# Patient Record
Sex: FEMALE | Race: WHITE
[De-identification: names, ages, dates, MRNs, and addresses within clinical notes are randomized per-mention and may not be internally consistent; named-entity substitution may affect disease eponyms.]

---

## 2017-12-19 ENCOUNTER — HOSPITAL ENCOUNTER (EMERGENCY)
Dept: HOSPITAL 75 - ER | Age: 37
Discharge: HOME | End: 2017-12-19
Payer: SELF-PAY

## 2017-12-19 VITALS — BODY MASS INDEX: 32.95 KG/M2 | WEIGHT: 205 LBS | HEIGHT: 66 IN

## 2017-12-19 VITALS — SYSTOLIC BLOOD PRESSURE: 120 MMHG | DIASTOLIC BLOOD PRESSURE: 88 MMHG

## 2017-12-19 DIAGNOSIS — O99.341: ICD-10-CM

## 2017-12-19 DIAGNOSIS — F41.9: ICD-10-CM

## 2017-12-19 DIAGNOSIS — Z79.84: ICD-10-CM

## 2017-12-19 DIAGNOSIS — Z3A.10: ICD-10-CM

## 2017-12-19 DIAGNOSIS — R10.2: ICD-10-CM

## 2017-12-19 DIAGNOSIS — O99.89: Primary | ICD-10-CM

## 2017-12-19 LAB
BASOPHILS # BLD AUTO: 0 10^3/UL (ref 0–0.1)
BASOPHILS NFR BLD AUTO: 0 % (ref 0–10)
BASOPHILS NFR BLD MANUAL: 0 %
BILIRUB UR QL STRIP: NEGATIVE
EOSINOPHIL # BLD AUTO: 0.1 10^3/UL (ref 0–0.3)
EOSINOPHIL NFR BLD AUTO: 1 % (ref 0–10)
EOSINOPHIL NFR BLD MANUAL: 0 %
ERYTHROCYTE [DISTWIDTH] IN BLOOD BY AUTOMATED COUNT: 12.6 % (ref 10–14.5)
KETONES UR QL STRIP: NEGATIVE
LEUKOCYTE ESTERASE UR QL STRIP: NEGATIVE
LYMPHOCYTES # BLD AUTO: 4.4 X 10^3 (ref 1–4)
LYMPHOCYTES NFR BLD AUTO: 29 % (ref 12–44)
MCH RBC QN AUTO: 30 PG (ref 25–34)
MCHC RBC AUTO-ENTMCNC: 35 G/DL (ref 32–36)
MCV RBC AUTO: 85 FL (ref 80–99)
MONOCYTES # BLD AUTO: 1.1 X 10^3 (ref 0–1)
MONOCYTES NFR BLD AUTO: 7 % (ref 0–12)
NEUTROPHILS # BLD AUTO: 9.5 X 10^3 (ref 1.8–7.8)
NEUTROPHILS NFR BLD AUTO: 63 % (ref 42–75)
NEUTS BAND NFR BLD MANUAL: 71 %
NEUTS BAND NFR BLD: 1 %
NITRITE UR QL STRIP: NEGATIVE
PH UR STRIP: 6 [PH] (ref 5–9)
PLATELET # BLD: 383 10^3/UL (ref 130–400)
PMV BLD AUTO: 10.5 FL (ref 7.4–10.4)
PROT UR QL STRIP: NEGATIVE
RBC # BLD AUTO: 4.48 10^6/UL (ref 4.35–5.85)
SP GR UR STRIP: 1.01 (ref 1.02–1.02)
SQUAMOUS #/AREA URNS HPF: (no result) /HPF
UROBILINOGEN UR-MCNC: NORMAL MG/DL
VARIANT LYMPHS NFR BLD MANUAL: 26 %
WBC # BLD AUTO: 15.1 10^3/UL (ref 4.3–11)
WBC #/AREA URNS HPF: (no result) /HPF

## 2017-12-19 PROCEDURE — 36415 COLL VENOUS BLD VENIPUNCTURE: CPT

## 2017-12-19 PROCEDURE — 84702 CHORIONIC GONADOTROPIN TEST: CPT

## 2017-12-19 PROCEDURE — 85007 BL SMEAR W/DIFF WBC COUNT: CPT

## 2017-12-19 PROCEDURE — 76801 OB US < 14 WKS SINGLE FETUS: CPT

## 2017-12-19 PROCEDURE — 99283 EMERGENCY DEPT VISIT LOW MDM: CPT

## 2017-12-19 PROCEDURE — 85027 COMPLETE CBC AUTOMATED: CPT

## 2017-12-19 PROCEDURE — 81000 URINALYSIS NONAUTO W/SCOPE: CPT

## 2017-12-19 NOTE — ED ABDOMINAL PAIN
General


Stated Complaint:  10 WKS PREG/STOMACH PAIN


Source of Information:  Patient


Exam Limitations:  No Limitations





History of Present Illness


Time Seen By Provider:  19:53


Initial Comments


To ER with bilateral lower abdominal pains that began this morning.  Pains are 

worse with certain movements and are sharp.  She is 10 weeks' gestation, .

  No vaginal bleeding.  She has not had an ultrasound but she did have a 

positive pregnancy test confirmed at the [facility] medical clinic.


Timing/Duration:  12-24 Hours


Severity/Quality:  Moderate


Location:  Suprapubic


Radiation:  No Radiation


Activities at Onset:  None





Allergies and Home Medications


Allergies


Coded Allergies:  


     No Known Drug Allergies (Unverified , 13)





Home Medications


Metformin Hcl 500 Mg Tablet, 500 MG PO BID WITH MEALS, (Reported)


Oxycodone Hcl/Acetaminophen 1 Tab Tablet, 1-2 TAB PO Q6H, #35


   Prescribed by: SUE DAVALOS on 14 1524





Review of Systems


Constitutional:  see HPI


EENTM:  No Symptoms Reported


Respiratory:  No Symptoms Reported


Cardiovascular:  No Symptoms Reported


Gastrointestinal:  See HPI, Abdominal Pain, Denies Constipated, Denies Diarrhea

, Denies Nausea


Genitourinary:  No Symptoms Reported, Denies Discharge (no vaginal bleeding)


Musculoskeletal:  no symptoms reported


Skin:  no symptoms reported


Psychiatric/Neurological:  No Symptoms Reported


Endocrine:  No Symptoms Reported





Past Medical-Social-Family Hx


Patient Social History


Recent Foreign Travel:  No


Contact w/Someone Who Travel:  No





Reproductive System


Hx Reproductive Disorders:  No


Sexually Transmitted Disease:  No





Psychosocial


Behavioral Health Disorders:  Anxiety





Physical Exam


Vital Signs





 VS - Last 72 Hours, by Label








 17





 19:45


 


Temp 97.0


 


Pulse 95


 


Resp 20


 


B/P (MAP) 119/91 (100)


 


Pulse Ox 98


 


O2 Delivery Room Air





Capillary Refill :


General Appearance:  WD/WN, no apparent distress


HEENT:  PERRL/EOMI, normal ENT inspection


Neck:  non-tender, full range of motion


Respiratory:  no respiratory distress, no accessory muscle use


Cardiovascular:  regular rate, rhythm, no murmur


Gastrointestinal:  normal bowel sounds, non tender, soft


Extremities:  normal range of motion, non-tender, normal inspection


Neurologic/Psychiatric:  alert, normal mood/affect, oriented x 3


Skin:  normal color, warm/dry





Progress/Results/Core Measures


Results/Orders


Lab Results





Laboratory Tests








Test


  17


19:55 17


20:09 Range/Units


 


 


Urine Color YELLOW    


 


Urine Clarity CLEAR    


 


Urine pH 6   5-9  


 


Urine Specific Gravity 1.015 L  1.016-1.022  


 


Urine Protein NEGATIVE   NEGATIVE  


 


Urine Glucose (UA) 3+ H  NEGATIVE  


 


Urine Ketones NEGATIVE   NEGATIVE  


 


Urine Nitrite NEGATIVE   NEGATIVE  


 


Urine Bilirubin NEGATIVE   NEGATIVE  


 


Urine Urobilinogen NORMAL   NORMAL  MG/DL


 


Urine Leukocyte Esterase NEGATIVE   NEGATIVE  


 


Urine RBC (Auto) NEGATIVE   NEGATIVE  


 


Urine RBC NONE    /HPF


 


Urine WBC 2-5    /HPF


 


Urine Squamous Epithelial


Cells 5-10 


  


   /HPF


 


 


Urine Crystals NONE    /LPF


 


Urine Bacteria MODERATE H   /HPF


 


Urine Casts NONE    /LPF


 


Urine Mucus NEGATIVE    /LPF


 


Urine Culture Indicated NO    


 


White Blood Count


  


  15.1 H


  4.3-11.0


10^3/uL


 


Red Blood Count


  


  4.48 


  4.35-5.85


10^6/uL


 


Hemoglobin  13.2  11.5-16.0  G/DL


 


Hematocrit  38  35-52  %


 


Mean Corpuscular Volume  85  80-99  FL


 


Mean Corpuscular Hemoglobin  30  25-34  PG


 


Mean Corpuscular Hemoglobin


Concent 


  35 


  32-36  G/DL


 


 


Red Cell Distribution Width  12.6  10.0-14.5  %


 


Platelet Count


  


  383 


  130-400


10^3/uL


 


Mean Platelet Volume  10.5 H 7.4-10.4  FL


 


Neutrophils (%) (Auto)  63  42-75  %


 


Lymphocytes (%) (Auto)  29  12-44  %


 


Monocytes (%) (Auto)  7  0-12  %


 


Eosinophils (%) (Auto)  1  0-10  %


 


Basophils (%) (Auto)  0  0-10  %


 


Neutrophils # (Auto)  9.5 H 1.8-7.8  X 10^3


 


Lymphocytes # (Auto)  4.4 H 1.0-4.0  X 10^3


 


Monocytes # (Auto)  1.1 H 0.0-1.0  X 10^3


 


Eosinophils # (Auto)


  


  0.1 


  0.0-0.3


10^3/uL


 


Basophils # (Auto)


  


  0.0 


  0.0-0.1


10^3/uL


 


Neutrophils % (Manual)  71   %


 


Lymphocytes % (Manual)  26   %


 


Monocytes % (Manual)  2   %


 


Eosinophils % (Manual)  0   %


 


Basophils % (Manual)  0   %


 


Band Neutrophils  1   %


 


Blood Morphology Comment  NORMAL   


 


Human Chorionic Gonadotropin,


Quant 


  [id number] H


  <5  MIU/ML


 








My Orders





Orders - ARMIDA TONEY APRN


Cbc With Automated Diff (17 19:51)


Hcg,Quantitative (17 19:51)


Ua Culture If Indicated (17 19:51)


Us Ob Single Fetus<14 [id number] (17 19:51)


Manual Differential (17 20:09)





Vital Signs/I&O





Vital Sign - Last 12Hours








 17





 19:45


 


Temp 97.0


 


Pulse 95


 


Resp 20


 


B/P (MAP) 119/91 (100)


 


Pulse Ox 98


 


O2 Delivery Room Air











Departure


Impression


Impression:  


 Primary Impression:  


 Round ligament pain


Disposition:   HOME, SELF-CARE


Condition:  Stable





Departure-Patient Inst.


Decision time for Depature:  21:07


Referrals:  


MIRTHA ROMERO DENNIS G MD





NO,LOCAL PHYSICIAN (PCP)


Primary Care Physician








BECKY WASHINGTON DO


Patient Instructions:  Round Ligament Pain





Add. Discharge Instructions:  


1. Return to ER for any concerns


2. Call one of the physicians listed to make an appointment to be seen.











ARMIDA TONEY APRN Dec 19, 2017 19:54

## 2017-12-19 NOTE — DIAGNOSTIC IMAGING REPORT
PROCEDURE: US OB SINGLE FETUS <14 WKS.



TECHNIQUE: Multiple real-time grayscale images were obtained over

the gravid uterus in various projections. 



INDICATION: Cramping.



FINDINGS:  

A santiago intrauterine gestation measures 10 weeks 2 days for a

date of confinement 07/15/2018. No subchorionic or felicita-sac

hemorrhage or fluid collection. Cardiac activity at a rate of 150

beats per minute. A right ovarian cyst measuring 2.6 cm showed no

suspicious feature. There is no adnexal torsion. No extrauterine

gestation.



IMPRESSION:

Early santiago viable IUP measuring 10 weeks 2 days.



Dictated by: 



  Dictated on workstation # [id number]

## 2017-12-19 NOTE — XMS REPORT
[facility]

 Created on: 2017



Sajan Donnelly

External Reference #: [id number]

: 1980

Sex: Female



Demographics







 Address  [address] [place]  [address]

 

 Preferred Language  Unknown

 

 Marital Status  Unknown

 

 Caodaism Affiliation  Unknown

 

 Race  Unknown

 

 Ethnic Group  Unknown





Author







 Author  GARLAND Kincaid

 

 Organization  [facility]

 

 Address  Unknown

 

 Phone  [phone]







Care Team Providers







 Care Team Member Name  Role  Phone

 

 GARLAND Kincaid  Unavailable  [phone]







PROBLEMS







 Type  Condition  ICD9-CM Code  WNJ66-CS Code  Onset Dates  Condition Status  
SNOMED Code

 

 Problem  Attention deficit hyperactivity disorder (ADHD), combined type     
F90.2     Active  747294794

 

 Problem  Moderate episode of recurrent major depressive disorder     F33.1     
Active  966036454

 

 Problem  Posttraumatic stress disorder     F43.10     Active  09048381

 

 Problem  Leukorrhea, not specified as infective  623.5        Active  411099747

 

 Problem  Screening examination for venereal disease  V74.5        Active  
[id number]







ALLERGIES

No Known Allergies



SOCIAL HISTORY

Never Assessed



PLAN OF CARE







 Activity  Details









  









 Follow Up  4 Weeks Reason:







VITAL SIGNS







 Height  66 in  2017

 

 Weight  210.7 lbs  2017

 

 Heart Rate  80 bpm  2017

 

 Respiratory Rate  20   2017

 

 BMI  34.00 kg/m2  2017

 

 Blood pressure systolic  132 mmHg  2017

 

 Blood pressure diastolic  86 mmHg  2017







MEDICATIONS







 Medication  Instructions  Dosage  Frequency  Start Date  End Date  Duration  
Status

 

 Adderall 20 MG  Orally twice a day  1 tablet  12h  08 May, 2017     30 days  
Active

 

 Trileptal 300 MG  Orally-voucher twice a day  1 tablet  12h  08 May, 2017     
30 days  Active







RESULTS

No Results



PROCEDURES







 Procedure  Date Ordered  Result  Body Site

 

 Psych diagnostic evaluation w/medical services, new patient  May 08, 2017      







IMMUNIZATIONS

No Known Immunizations



MEDICAL (GENERAL) HISTORY







 Type  Description  Date

 

 Medical History  Depression   

 

 Medical History  Anxiety   

 

 Medical History  ADHD   

 

 Surgical History  Gallbladder Removal   

 

 Surgical History  Appendectomy   

 

 Surgical History   x 2   

 

 Hospitalization History  Surgery/childbirth

## 2017-12-19 NOTE — XMS REPORT
Continuity of Care Document

 Created on: 2017



CAROLE LORD

External Reference #: [id number]

: 1980

Sex: Female



Demographics







 Address  [address]

[place]  [address]

 

 Home Phone  [phone] x

 

 Preferred Language  Unknown

 

 Marital Status  Unknown

 

 Oriental orthodox Affiliation  Unknown

 

 Race  Unknown

 

 Ethnic Group  Unknown





Author







 Author  [facility]

 

 Address  Unknown

 

 Phone  Unavailable



              



Allergies

      





 Active            Description            Code            Type            
Severity            Reaction            Onset            Reported/Identified   
         Relationship to Patient            Clinical Status        

 

 Yes            No Known Drug Allergies            [id number]            Drug 
Allergy            Unknown            N/A                         2013   
                               



                  



Medications

      



There is no data.                  



Problems

      





 Date Dx Coded            Attending            Type            Code            
Diagnosis            Diagnosed By        

 

 12/15/2009            TANYA BOOKER PHD                         296.90    
        EPISODIC MOOD DISORDERS                     

 

 12/15/2009            TANYA BOOKER PHD                         525.9     
       tooth pain                     

 

 12/15/2009            TANYA BOOKER PHD                         626.0     
       menstrual periods stopped for over 6 months (amenorrhea)                
     

 

 12/15/2009            TANYA BOOKER PHD                         780.79    
        feelings of weakness                     

 

 12/15/2009            WASHINGTON APRN, MACKENZIE A                         296.90     
       EPISODIC MOOD DISORDERS                     

 

 12/15/2009            WASHINGTON APRN, MACKENZIE A                         525.9      
      tooth pain                     

 

 12/15/2009            WASHINGTON APRN, MACKENZIE A                         626.0      
      menstrual periods stopped for over 6 months (amenorrhea)                 
    

 

 12/15/2009            WASHINGTON APRN, MACKENZIE A                         780.79     
       feelings of weakness                     

 

 12/15/2009            WASHINGTON APRN, MACKENZIE A                         296.90     
       EPISODIC MOOD DISORDERS                     

 

 12/15/2009            WASHINGTON APRN, MACKENZIE A                         525.9      
      tooth pain                     

 

 12/15/2009            WASHINGTON APRN, MACKENZIE A                         626.0      
      menstrual periods stopped for over 6 months (amenorrhea)                 
    

 

 12/15/2009            WASHINGTON APRN, MACKENZIE A                         780.79     
       feelings of weakness                     

 

 2011            TANYA BOOKER PHD                         314.01    
        ADHD COMBINED                     

 

 2011            WASHINGTON APRN, MACKENZIE A                         314.01     
       ADHD COMBINED                     

 

 2011            WASHINGTON APRN, MACKENZIE A                         314.01     
       ADHD COMBINED                     

 

 2011            TANYA BOOKER PHD                         300.02    
        AN GEN ANXIETY                     

 

 2011            MACKENZIE SOLIS A                         300.02     
       AN GEN ANXIETY                     

 

 2011            MACKENZIE SOLIS                         300.02     
       AN GEN ANXIETY                     

 

 2012            TANYA BOOKER PHD                         309.81    
        AN PTSD                     

 

 2012            CATALINA SOLISIDI VICKY                         309.81     
       AN PTSD                     

 

 2012            MACKENZIE SOLIS                         309.81     
       AN PTSD                     

 

 07/10/2014            MACKENZIE SOLIS                         623.5      
      LEUKORRHEA NOT SPECIFIED AS INFECTIVE                     

 

 07/10/2014            MACKENZIE SOLIS                         V74.5      
      STD SCREEN                     

 

 07/10/2014            MACKENZIE SOLIS                         623.5      
      LEUKORRHEA NOT SPECIFIED AS INFECTIVE                     

 

 07/10/2014            MACKENZIE SOLIS                         V74.5      
      STD SCREEN                     



                                                                  



Procedures

      





 Code            Description            Performed By            Performed On   
     

 

             00177                                  PSYCH DIAGNOSTIC EVALUATION
                                   2014        

 

             00602                                  GC/CHLAM PROBE (STATE)     
                              07/10/2014        

 

             89955                                  PREGNANCY TEST, URINE (IN-
HOUSE)                                   07/10/2014        

 

             81777                                  CULTURE UROGENITAL         
                          2014        

 

             16544                                  TRICHOMONAS (IN-HOUSE)     
                              07/15/2014        



                          



Results

      



There is no data.              



Encounters

      





 ACCT No.            Visit Date/Time            Discharge            Status    
        Pt. Type            Provider            Facility            Loc./Unit  
          Complaint        

 

 [id number]            07/10/2014 11:40:00            07/10/2014 23:59:59          
  [facility]            Outpatient            [facility] SOL[facility]                    
                           

 

 [id number]            07/10/2014 11:40:00            07/10/2014 23:59:59          
  [facility]            Outpatient            [facility]                    
                           

 

 [id number]            2014 11:09:00            2014 23:59:59          
  [facility]            Outpatient            TANYA BOOKER PHD                   
                            

 

 [id number]            2014 22:15:00            2014 23:19:00    
        [facility]            Emergency                                               
             

 

 [id number]            2014 11:15:00            2014 17:20:00    
        [facility]            Outpatient                                              
              

 

 [id number]            2014 13:01:00            2014 23:59:59    
        [facility]            Outpatient                                              
              

 

 [id number]            08/10/2014 11:01:00            08/10/2014 13:34:00    
        [facility]            Emergency                                               
             

 

 [id number]            2014 01:34:00            2014 04:15:00    
        DIS            Emergency                                               
             

 

 [id number]            2013 15:58:00            2013 18:57:00    
        DIS            Emergency                                               
             

 

 [id number]            2017 19:43:00                         [facility]       
     Emergency            HILLARY FUCHS, KEELY BROCK            [facility]            ER            10 WKS PREG/STOMACH PAIN

## 2017-12-19 NOTE — XMS REPORT
[facility]

 Created on: 2017



Sajan Donnelly

External Reference #: [id number]

: 1980

Sex: Female



Demographics







 Address  [address] [place]  [address]

 

 Preferred Language  Unknown

 

 Marital Status  Unknown

 

 Christianity Affiliation  Unknown

 

 Race  Unknown

 

 Ethnic Group  Unknown





Author







 Author  KLAUDIA GARRISON

 

 [facility]

 

 Address  [address] [place]  [address]



 

 Phone  [phone]







Care Team Providers







 Care Team Member Name  Role  Phone

 

 KLAUDIA GARRISON  Unavailable  [phone]







PROBLEMS







 Type  Condition  ICD9-CM Code  GIU88-CI Code  Onset Dates  Condition Status  
SNOMED Code

 

 Problem  Attention deficit hyperactivity disorder (ADHD), combined type     
F90.2     Active  358732596

 

 Problem  Moderate episode of recurrent major depressive disorder     F33.1     
Active  721905720

 

 Problem  Posttraumatic stress disorder     F43.10     Active  16782168

 

 Problem  Leukorrhea, not specified as infective  623.5        Active  105465756

 

 Problem  Screening examination for venereal disease  V74.5        Active  
494301786







ALLERGIES

No Information



SOCIAL HISTORY

Never Assessed



PLAN OF CARE







 Activity  Details









  









 Follow Up  3 Weeks Reason:Depression, ADHD







VITAL SIGNS





MEDICATIONS

Unknown Medications



RESULTS

No Results



PROCEDURES







 Procedure  Date Ordered  Result  Body Site

 

 Psychotherapy, patient &/family, 30 minutes, established patient  May 30, 2017
      







IMMUNIZATIONS

No Known Immunizations



MEDICAL (GENERAL) HISTORY







 Type  Description  Date

 

 Medical History  Depression   

 

 Medical History  Anxiety   

 

 Medical History  ADHD   

 

 Surgical History  Gallbladder Removal   

 

 Surgical History  Appendectomy   

 

 Surgical History   x 2   

 

 Hospitalization History  Surgery/childbirth

## 2018-02-20 ENCOUNTER — HOSPITAL ENCOUNTER (OUTPATIENT)
Dept: HOSPITAL 75 - RAD | Age: 38
End: 2018-02-20
Attending: FAMILY MEDICINE
Payer: MEDICAID

## 2018-02-20 DIAGNOSIS — Z3A.19: ICD-10-CM

## 2018-02-20 DIAGNOSIS — Z36.89: Primary | ICD-10-CM

## 2018-02-20 PROCEDURE — 76805 OB US >/= 14 WKS SNGL FETUS: CPT

## 2018-02-20 NOTE — DIAGNOSTIC IMAGING REPORT
INDICATION: Fetal survey.



TECHNIQUE: Multiple real-time grayscale images were obtained over

the gravid uterus.



COMPARISON: 12/19/2017.



FINDINGS: There is a single live fetus in a transverse

presentation, head to maternal right. The placenta is posterior.

The amniotic fluid volume is normal. Fetal heart rate was

recorded at 152 beats per minute. Fetal kidneys and four-chamber

heart view were not well visualized due to fetal position. Fetal

bladder and stomach are unremarkable. Intracranial structures are

unremarkable. There is a three-vessel cord with normal cord

insertion. Fetal spine is unremarkable.



Biometrical measurements are as follows:

Biparietal 4.3 cm, age 19 weeks 1 days.

Head circumference 16.5 cm, age 19 weeks 2 days.

Abdominal circumference 13.9 cm, age 19 weeks 3 days.

Femur length 3.0 cm, age 19 weeks 3 days.



Sonographic estimate age: 19 weeks 3 days.

Sonographic estimated date of delivery: 07/14/18.



Estimated Fetal Weight: 286 gm (+/- 42  gm).

LMP percentile: 47%.



Fetal heart rate: 152 beats per minute.



Fetal number: 1 of 1.



IMPRESSION: Single live IUP approximately 19 weeks 3 days

gestational age with an estimated date of confinement of

07/14/2018. Note is made that the four-chamber heart view and

kidney views are not well visualized due to fetal position.

Followup ultrasound could be performed.



Dictated by: 



  Dictated on workstation # [id number]

## 2018-04-01 ENCOUNTER — HOSPITAL ENCOUNTER (OUTPATIENT)
Dept: HOSPITAL 75 - WSO | Age: 38
Discharge: HOME | End: 2018-04-01
Attending: FAMILY MEDICINE
Payer: MEDICAID

## 2018-04-01 VITALS — DIASTOLIC BLOOD PRESSURE: 76 MMHG | SYSTOLIC BLOOD PRESSURE: 118 MMHG

## 2018-04-01 VITALS — HEIGHT: 66 IN | WEIGHT: 241.12 LBS | BODY MASS INDEX: 38.75 KG/M2

## 2018-04-01 DIAGNOSIS — O99.89: Primary | ICD-10-CM

## 2018-04-01 DIAGNOSIS — O36.8120: ICD-10-CM

## 2018-04-01 DIAGNOSIS — Z3A.25: ICD-10-CM

## 2018-04-01 DIAGNOSIS — R51: ICD-10-CM

## 2018-04-01 PROCEDURE — 99212 OFFICE O/P EST SF 10 MIN: CPT

## 2018-04-02 NOTE — PHYSICIAN QUERY-FINAL DX
AILIN CAZARES 4/2/18 1219:


Clinic Account Progress/Dx


Physician Query:


Please give diagnosis


Date of Service





Apr 1, 2018 at 22:42





ARRON ALATORRE MD 4/3/18 0717:


Clinic Account Progress/Dx


DIAGNOSIS:


Diagnosis


1.  IUP in 2nd trimester


2.  Headache


3.  Decreased fetal movement--reassuring pattern











AILIN CAZARES Apr 2, 2018 12:19


ARRON ALATORRE MD Apr 3, 2018 07:17

## 2018-04-11 ENCOUNTER — HOSPITAL ENCOUNTER (OUTPATIENT)
Dept: HOSPITAL 75 - RAD | Age: 38
End: 2018-04-11
Attending: FAMILY MEDICINE
Payer: MEDICAID

## 2018-04-11 DIAGNOSIS — Z34.92: Primary | ICD-10-CM

## 2018-04-11 DIAGNOSIS — Z3A.24: ICD-10-CM

## 2018-04-11 PROCEDURE — 76816 OB US FOLLOW-UP PER FETUS: CPT

## 2018-04-11 NOTE — DIAGNOSTIC IMAGING REPORT
INDICATION: Followup fetal kidneys and four-chamber heart.



TECHNIQUE: Multiple real-time grayscale images were obtained over

the gravid uterus.



COMPARISON: 02/20/2018.



FINDINGS: There is a single live fetus in a cephalic

presentation. The placenta is posterior. The amniotic fluid

volume is normal. A limited fetal survey does show the fetal

kidneys and four-chamber heart on today's study. Fetal heart rate

was recorded at 172 beats per minute.



Biometrical measurements are as follows:

Biparietal 6.61 cm, age 26 weeks 5 days.

Head circumference 25.74 cm, age 28 weeks 0 days.

Abdominal circumference 25.5 cm, age 29 weeks 5 days.

Femur length 5.38 cm, age 28 weeks 4 days.



Sonographic estimate age: 28 weeks 2 days.

Sonographic estimated date of delivery: 07/02/18.



Estimated Fetal Weight: 1305 gm (+/- 191  gm).

LMP percentile: 98%.



Fetal heart rate: 172 beats per minute.



Fetal number: 1 of 1.



IMPRESSION: Single live IUP at approximately 28 weeks gestational

age. The fetal kidneys and four-chamber heart view were

visualized on today's study and appear unremarkable.



Dictated by: 



  Dictated on workstation # [id number]

## 2018-05-20 ENCOUNTER — HOSPITAL ENCOUNTER (OUTPATIENT)
Dept: HOSPITAL 75 - WSO | Age: 38
Discharge: HOME | End: 2018-05-20
Attending: FAMILY MEDICINE
Payer: MEDICAID

## 2018-05-20 VITALS — SYSTOLIC BLOOD PRESSURE: 121 MMHG | DIASTOLIC BLOOD PRESSURE: 79 MMHG

## 2018-05-20 VITALS — HEIGHT: 66 IN | BODY MASS INDEX: 38.75 KG/M2 | WEIGHT: 241.12 LBS

## 2018-05-20 VITALS — SYSTOLIC BLOOD PRESSURE: 155 MMHG | DIASTOLIC BLOOD PRESSURE: 87 MMHG

## 2018-05-20 VITALS — SYSTOLIC BLOOD PRESSURE: 125 MMHG | DIASTOLIC BLOOD PRESSURE: 66 MMHG

## 2018-05-20 VITALS — SYSTOLIC BLOOD PRESSURE: 117 MMHG | DIASTOLIC BLOOD PRESSURE: 72 MMHG

## 2018-05-20 VITALS — SYSTOLIC BLOOD PRESSURE: 125 MMHG | DIASTOLIC BLOOD PRESSURE: 86 MMHG

## 2018-05-20 VITALS — SYSTOLIC BLOOD PRESSURE: 120 MMHG | DIASTOLIC BLOOD PRESSURE: 64 MMHG

## 2018-05-20 DIAGNOSIS — O9A.213: Primary | ICD-10-CM

## 2018-05-20 DIAGNOSIS — Z3A.32: ICD-10-CM
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
## 2018-05-20 DIAGNOSIS — Z67.10: ICD-10-CM

## 2018-05-20 PROCEDURE — 85460 HEMOGLOBIN FETAL: CPT

## 2018-05-20 PROCEDURE — 86850 RBC ANTIBODY SCREEN: CPT

## 2018-05-20 PROCEDURE — 86900 BLOOD TYPING SEROLOGIC ABO: CPT

## 2018-05-20 PROCEDURE — 86901 BLOOD TYPING SEROLOGIC RH(D): CPT

## 2018-05-20 PROCEDURE — 99213 OFFICE O/P EST LOW 20 MIN: CPT

## 2018-05-21 NOTE — PHYSICIAN QUERY-FINAL DX
GUILLERMO TOMAS 5/21/18 2017:


Clinic Account Progress/Dx


Physician Query:


Please give diagnosis


Date of Service





May 20, 2018 at 15:44





HUNTER SANTIAGO DO 5/21/18 2048:


Clinic Account Progress/Dx


DIAGNOSIS:


Diagnosis


O9A.213 - Injury, poisoning and certain other consequences of external causes 

complicating pregnancy, third trimester


W01.1 - Fall on same level from slipping, tripping and stumbling with 

subsequent striking against object


Z67.10 - Type A Blood, Rh Positive











GUILLERMO TOMAS May 21, 2018 20:17


HUNTER SANTIAGO DO May 21, 2018 20:48

## 2018-05-25 ENCOUNTER — HOSPITAL ENCOUNTER (OUTPATIENT)
Dept: HOSPITAL 75 - WSO | Age: 38
Discharge: HOME | End: 2018-05-25
Attending: FAMILY MEDICINE
Payer: MEDICAID

## 2018-05-25 VITALS — BODY MASS INDEX: 38.11 KG/M2 | HEIGHT: 66.5 IN | WEIGHT: 240 LBS

## 2018-05-25 VITALS — SYSTOLIC BLOOD PRESSURE: 109 MMHG | DIASTOLIC BLOOD PRESSURE: 65 MMHG

## 2018-05-25 DIAGNOSIS — Z3A.32: ICD-10-CM

## 2018-05-25 DIAGNOSIS — R42: Primary | ICD-10-CM

## 2018-05-25 DIAGNOSIS — R39.15: ICD-10-CM

## 2018-05-25 LAB
ALBUMIN SERPL-MCNC: 3.5 GM/DL (ref 3.2–4.5)
ALP SERPL-CCNC: 59 U/L (ref 40–136)
ALT SERPL-CCNC: 10 U/L (ref 0–55)
APTT PPP: YELLOW S
BACTERIA #/AREA URNS HPF: (no result) /HPF
BASOPHILS # BLD AUTO: 0 10^3/UL (ref 0–0.1)
BASOPHILS NFR BLD AUTO: 0 % (ref 0–10)
BILIRUB SERPL-MCNC: 0.3 MG/DL (ref 0.1–1)
BILIRUB UR QL STRIP: NEGATIVE
BUN/CREAT SERPL: 18
CALCIUM SERPL-MCNC: 8.9 MG/DL (ref 8.5–10.1)
CHLORIDE SERPL-SCNC: 109 MMOL/L (ref 98–107)
CO2 SERPL-SCNC: 17 MMOL/L (ref 21–32)
CREAT SERPL-MCNC: 0.65 MG/DL (ref 0.6–1.3)
EOSINOPHIL # BLD AUTO: 0.1 10^3/UL (ref 0–0.3)
EOSINOPHIL NFR BLD AUTO: 1 % (ref 0–10)
ERYTHROCYTE [DISTWIDTH] IN BLOOD BY AUTOMATED COUNT: 14.2 % (ref 10–14.5)
FIBRINOGEN PPP-MCNC: CLEAR MG/DL
GFR SERPLBLD BASED ON 1.73 SQ M-ARVRAT: > 60 ML/MIN
GLUCOSE SERPL-MCNC: 92 MG/DL (ref 70–105)
GLUCOSE UR STRIP-MCNC: NEGATIVE MG/DL
HCT VFR BLD CALC: 35 % (ref 35–52)
HGB BLD-MCNC: 11.8 G/DL (ref 11.5–16)
KETONES UR QL STRIP: (no result)
LEUKOCYTE ESTERASE UR QL STRIP: (no result)
LYMPHOCYTES # BLD AUTO: 2.4 X 10^3 (ref 1–4)
LYMPHOCYTES NFR BLD AUTO: 23 % (ref 12–44)
MANUAL DIFFERENTIAL PERFORMED BLD QL: NO
MCH RBC QN AUTO: 28 PG (ref 25–34)
MCHC RBC AUTO-ENTMCNC: 33 G/DL (ref 32–36)
MCV RBC AUTO: 83 FL (ref 80–99)
MONOCYTES # BLD AUTO: 0.7 X 10^3 (ref 0–1)
MONOCYTES NFR BLD AUTO: 6 % (ref 0–12)
NEUTROPHILS # BLD AUTO: 7.5 X 10^3 (ref 1.8–7.8)
NEUTROPHILS NFR BLD AUTO: 70 % (ref 42–75)
NITRITE UR QL STRIP: NEGATIVE
PH UR STRIP: 6 [PH] (ref 5–9)
PLATELET # BLD: 256 10^3/UL (ref 130–400)
PMV BLD AUTO: 10.5 FL (ref 7.4–10.4)
POTASSIUM SERPL-SCNC: 3.9 MMOL/L (ref 3.6–5)
PROT SERPL-MCNC: 6.9 GM/DL (ref 6.4–8.2)
PROT UR QL STRIP: NEGATIVE
RBC # BLD AUTO: 4.26 10^6/UL (ref 4.35–5.85)
RBC #/AREA URNS HPF: (no result) /HPF
SODIUM SERPL-SCNC: 136 MMOL/L (ref 135–145)
SP GR UR STRIP: 1.02 (ref 1.02–1.02)
UROBILINOGEN UR-MCNC: 1 MG/DL
WBC # BLD AUTO: 10.7 10^3/UL (ref 4.3–11)
WBC #/AREA URNS HPF: (no result) /HPF

## 2018-05-25 PROCEDURE — 80053 COMPREHEN METABOLIC PANEL: CPT

## 2018-05-25 PROCEDURE — 99213 OFFICE O/P EST LOW 20 MIN: CPT

## 2018-05-25 PROCEDURE — 96360 HYDRATION IV INFUSION INIT: CPT

## 2018-05-25 PROCEDURE — 36415 COLL VENOUS BLD VENIPUNCTURE: CPT

## 2018-05-25 PROCEDURE — 85025 COMPLETE CBC W/AUTO DIFF WBC: CPT

## 2018-05-25 PROCEDURE — 96361 HYDRATE IV INFUSION ADD-ON: CPT

## 2018-05-25 PROCEDURE — 81000 URINALYSIS NONAUTO W/SCOPE: CPT

## 2018-05-25 PROCEDURE — 87088 URINE BACTERIA CULTURE: CPT

## 2018-05-29 NOTE — PHYSICIAN QUERY-FINAL DX
GUILLERMO TOMAS 5/29/18 1557:


Clinic Account Progress/Dx


Physician Query:


Please give diagnosis


Date of Service





May 25, 2018 at 16:48





TENA MATHIS MD 6/7/18 0940:


Clinic Account Progress/Dx


DIAGNOSIS:


Diagnosis


32 weeks gestation


Dizziness


Urinary urgency











GUILLERMO TOMAS May 29, 2018 15:57


TENA MATHIS MD Jun 7, 2018 09:40

## 2018-06-07 ENCOUNTER — HOSPITAL ENCOUNTER (OUTPATIENT)
Dept: HOSPITAL 75 - LDRP | Age: 38
Discharge: HOME | End: 2018-06-07
Attending: FAMILY MEDICINE
Payer: MEDICAID

## 2018-06-07 VITALS — SYSTOLIC BLOOD PRESSURE: 123 MMHG | DIASTOLIC BLOOD PRESSURE: 70 MMHG

## 2018-06-07 DIAGNOSIS — O24.419: Primary | ICD-10-CM

## 2018-06-07 DIAGNOSIS — Z3A.34: ICD-10-CM

## 2018-06-07 PROCEDURE — 59025 FETAL NON-STRESS TEST: CPT

## 2018-06-08 ENCOUNTER — HOSPITAL ENCOUNTER (OUTPATIENT)
Dept: HOSPITAL 75 - RAD | Age: 38
End: 2018-06-08
Attending: FAMILY MEDICINE
Payer: MEDICAID

## 2018-06-08 DIAGNOSIS — O24.419: Primary | ICD-10-CM

## 2018-06-08 DIAGNOSIS — Z3A.34: ICD-10-CM

## 2018-06-08 PROCEDURE — 76816 OB US FOLLOW-UP PER FETUS: CPT

## 2018-06-08 NOTE — DIAGNOSTIC IMAGING REPORT
INDICATION: Gestational diabetes.



TECHNIQUE: Multiple real-time grayscale images were obtained over

the gravid uterus.



COMPARISON: 12/19/2017, 02/20/2018, and 04/11/2018.



FINDINGS: The prior exam of 04/11/2018 noted a single live fetus

at approximately 28 weeks gestation +/-1 week. On this study, the

fetus is again identified. The fetus is cephalic in presentation

and fetal heart motion was noted with a rate of 143 BPM recorded.

There were no obvious fetal abnormalities identified. The fetal

growth parameters average at 35 weeks 4 days +/-3.5 weeks. By the

first exam, the estimated gestational age should be 34 weeks 5

days. The estimated fetal weight is in the 74th percentile.



There were no fetal abnormalities identified. The amniotic fluid

index is 10.5 cm (normal 8 to 22 cm). The placenta is posterior

and there is no previa. The cervix was not visualized on this

study.



IMPRESSION:

1. There is a single live fetus at approximately 34 weeks 5 days

gestation +/-1 week. The EDC is 07/15/2018.

2. There were no obvious fetal abnormalities identified.

3. The fetal growth parameters have progressed as expected since

the prior exam tending towards the high side of normal.



Biometrical measurements are as follows:

Biparietal 8.5 cm, age 34 weeks 2 days.

Head circumference 30.1 cm, age 34 weeks 4 days.

Abdominal circumference 34.2 cm, age 38 weeks 1 days.

Femur length 6.8 cm, age 34 weeks 6 days.



Sonographic estimate age: 35 weeks 4 days.

Sonographic estimated date of delivery: 07/09/18.



Estimated Fetal Weight: 2945 gm (+/- 430  gm).

LMP percentile: 90%.



Fetal heart rate: 143 beats per minute.



Fetal number: 1 of 1.



Dictated by: 



  Dictated on workstation # [id number]

## 2018-06-12 ENCOUNTER — HOSPITAL ENCOUNTER (OUTPATIENT)
Dept: HOSPITAL 75 - WSO | Age: 38
Discharge: HOME | End: 2018-06-12
Attending: FAMILY MEDICINE
Payer: MEDICAID

## 2018-06-12 VITALS — HEIGHT: 66.5 IN | WEIGHT: 240 LBS | BODY MASS INDEX: 38.11 KG/M2

## 2018-06-12 VITALS — SYSTOLIC BLOOD PRESSURE: 110 MMHG | DIASTOLIC BLOOD PRESSURE: 66 MMHG

## 2018-06-12 DIAGNOSIS — O24.419: Primary | ICD-10-CM

## 2018-06-12 DIAGNOSIS — Z3A.34: ICD-10-CM

## 2018-06-12 PROCEDURE — 99213 OFFICE O/P EST LOW 20 MIN: CPT

## 2018-06-12 PROCEDURE — 96360 HYDRATION IV INFUSION INIT: CPT

## 2018-06-13 NOTE — PHYSICIAN QUERY-FINAL DX
GUILLERMO TOMAS 6/13/18 2341:


Clinic Account Progress/Dx


Physician Query:


Please give diagnosis


Date of Service





Jun 12, 2018 at 15:53





ARRON ALATORRE MD 6/14/18 0757:


Clinic Account Progress/Dx


DIAGNOSIS:


Diagnosis


1.  Intrauterine pregnancy at 34 weeks gestation


2.  Gestational diabetes











GUILLERMO TOMAS Jun 13, 2018 23:41


ARRON ALATORRE MD Jun 14, 2018 07:57

## 2018-06-20 ENCOUNTER — HOSPITAL ENCOUNTER (OUTPATIENT)
Dept: HOSPITAL 75 - WSO | Age: 38
End: 2018-06-20
Attending: FAMILY MEDICINE
Payer: MEDICAID

## 2018-06-20 VITALS — SYSTOLIC BLOOD PRESSURE: 112 MMHG | DIASTOLIC BLOOD PRESSURE: 66 MMHG

## 2018-06-20 DIAGNOSIS — Z3A.36: ICD-10-CM

## 2018-06-20 DIAGNOSIS — O24.419: Primary | ICD-10-CM

## 2018-06-20 PROCEDURE — 59025 FETAL NON-STRESS TEST: CPT

## 2018-06-21 NOTE — PHYSICIAN QUERY-FINAL DX
AILIN CAZARES 6/21/18 1448:


Clinic Account Progress/Dx


Physician Query:


Please give diagnosis


Date of Service





Jun 20, 2018 at 19:00





ARRON ALATORRE MD 6/22/18 0722:


Clinic Account Progress/Dx


DIAGNOSIS:


Diagnosis


1.  Gestational diabetes


2.  IUP at 36 weeks.











AILIN CAZARES Jun 21, 2018 14:48


ARRON ALATORRE MD Jun 22, 2018 07:22

## 2018-06-27 ENCOUNTER — HOSPITAL ENCOUNTER (INPATIENT)
Dept: HOSPITAL 75 - LDRP | Age: 38
LOS: 2 days | Discharge: HOME | End: 2018-06-29
Attending: OBSTETRICS & GYNECOLOGY | Admitting: OBSTETRICS & GYNECOLOGY
Payer: MEDICAID

## 2018-06-27 VITALS — SYSTOLIC BLOOD PRESSURE: 102 MMHG | DIASTOLIC BLOOD PRESSURE: 71 MMHG

## 2018-06-27 VITALS — SYSTOLIC BLOOD PRESSURE: 110 MMHG | DIASTOLIC BLOOD PRESSURE: 72 MMHG

## 2018-06-27 VITALS — SYSTOLIC BLOOD PRESSURE: 121 MMHG | DIASTOLIC BLOOD PRESSURE: 83 MMHG

## 2018-06-27 VITALS — DIASTOLIC BLOOD PRESSURE: 70 MMHG | SYSTOLIC BLOOD PRESSURE: 117 MMHG

## 2018-06-27 VITALS — WEIGHT: 238 LBS | BODY MASS INDEX: 37.8 KG/M2 | HEIGHT: 66.5 IN

## 2018-06-27 VITALS — SYSTOLIC BLOOD PRESSURE: 114 MMHG | DIASTOLIC BLOOD PRESSURE: 81 MMHG

## 2018-06-27 DIAGNOSIS — O41.03X0: Primary | ICD-10-CM

## 2018-06-27 DIAGNOSIS — D25.2: ICD-10-CM

## 2018-06-27 DIAGNOSIS — O34.13: ICD-10-CM

## 2018-06-27 DIAGNOSIS — Z3A.37: ICD-10-CM

## 2018-06-27 DIAGNOSIS — D25.1: ICD-10-CM

## 2018-06-27 DIAGNOSIS — O34.211: ICD-10-CM

## 2018-06-27 DIAGNOSIS — D62: ICD-10-CM

## 2018-06-27 DIAGNOSIS — N73.6: ICD-10-CM

## 2018-06-27 DIAGNOSIS — O24.415: ICD-10-CM

## 2018-06-27 LAB
BASOPHILS # BLD AUTO: 0 10^3/UL (ref 0–0.1)
BASOPHILS NFR BLD AUTO: 0 % (ref 0–10)
EOSINOPHIL # BLD AUTO: 0.1 10^3/UL (ref 0–0.3)
EOSINOPHIL NFR BLD AUTO: 1 % (ref 0–10)
ERYTHROCYTE [DISTWIDTH] IN BLOOD BY AUTOMATED COUNT: 14.2 % (ref 10–14.5)
HCT VFR BLD CALC: 36 % (ref 35–52)
HGB BLD-MCNC: 12.5 G/DL (ref 11.5–16)
LYMPHOCYTES # BLD AUTO: 3.1 X 10^3 (ref 1–4)
LYMPHOCYTES NFR BLD AUTO: 29 % (ref 12–44)
MANUAL DIFFERENTIAL PERFORMED BLD QL: NO
MCH RBC QN AUTO: 28 PG (ref 25–34)
MCHC RBC AUTO-ENTMCNC: 35 G/DL (ref 32–36)
MCV RBC AUTO: 82 FL (ref 80–99)
MONOCYTES # BLD AUTO: 0.7 X 10^3 (ref 0–1)
MONOCYTES NFR BLD AUTO: 7 % (ref 0–12)
NEUTROPHILS # BLD AUTO: 6.5 X 10^3 (ref 1.8–7.8)
NEUTROPHILS NFR BLD AUTO: 62 % (ref 42–75)
PLATELET # BLD: 268 10^3/UL (ref 130–400)
PMV BLD AUTO: 11.2 FL (ref 7.4–10.4)
RBC # BLD AUTO: 4.43 10^6/UL (ref 4.35–5.85)
WBC # BLD AUTO: 10.4 10^3/UL (ref 4.3–11)

## 2018-06-27 PROCEDURE — 86901 BLOOD TYPING SEROLOGIC RH(D): CPT

## 2018-06-27 PROCEDURE — 88307 TISSUE EXAM BY PATHOLOGIST: CPT

## 2018-06-27 PROCEDURE — 82962 GLUCOSE BLOOD TEST: CPT

## 2018-06-27 PROCEDURE — 87081 CULTURE SCREEN ONLY: CPT

## 2018-06-27 PROCEDURE — 80053 COMPREHEN METABOLIC PANEL: CPT

## 2018-06-27 PROCEDURE — 94664 DEMO&/EVAL PT USE INHALER: CPT

## 2018-06-27 PROCEDURE — 88305 TISSUE EXAM BY PATHOLOGIST: CPT

## 2018-06-27 PROCEDURE — 85025 COMPLETE CBC W/AUTO DIFF WBC: CPT

## 2018-06-27 PROCEDURE — 86850 RBC ANTIBODY SCREEN: CPT

## 2018-06-27 PROCEDURE — 36415 COLL VENOUS BLD VENIPUNCTURE: CPT

## 2018-06-27 PROCEDURE — 86900 BLOOD TYPING SEROLOGIC ABO: CPT

## 2018-06-27 RX ADMIN — KETOROLAC TROMETHAMINE SCH MG: 30 INJECTION, SOLUTION INTRAMUSCULAR; INTRAVENOUS at 21:06

## 2018-06-27 RX ADMIN — DOCUSATE SODIUM SCH MG: 100 CAPSULE ORAL at 21:32

## 2018-06-27 RX ADMIN — SODIUM CHLORIDE, SODIUM LACTATE, POTASSIUM CHLORIDE, AND CALCIUM CHLORIDE PRN MLS/HR: 600; 310; 30; 20 INJECTION, SOLUTION INTRAVENOUS at 07:50

## 2018-06-27 RX ADMIN — Medication SCH ML: at 15:38

## 2018-06-27 RX ADMIN — HYDROCODONE BITARTRATE AND ACETAMINOPHEN PRN TAB: 5; 325 TABLET ORAL at 18:01

## 2018-06-27 RX ADMIN — SODIUM CHLORIDE, SODIUM LACTATE, POTASSIUM CHLORIDE, AND CALCIUM CHLORIDE PRN MLS/HR: 600; 310; 30; 20 INJECTION, SOLUTION INTRAVENOUS at 06:49

## 2018-06-27 RX ADMIN — Medication SCH ML: at 21:06

## 2018-06-27 RX ADMIN — KETOROLAC TROMETHAMINE SCH MG: 30 INJECTION, SOLUTION INTRAMUSCULAR; INTRAVENOUS at 09:54

## 2018-06-27 RX ADMIN — HYDROCODONE BITARTRATE AND ACETAMINOPHEN PRN TAB: 5; 325 TABLET ORAL at 23:52

## 2018-06-27 RX ADMIN — KETOROLAC TROMETHAMINE SCH MG: 30 INJECTION, SOLUTION INTRAMUSCULAR; INTRAVENOUS at 15:37

## 2018-06-27 NOTE — XMS REPORT
[facility]

 Created on: 2018



Sajan Donnelly

External Reference #: [id number]

: 1980

Sex: Female



Demographics







 Address  [address] [place]  [address]

 

 Preferred Language  Unknown

 

 Marital Status  Unknown

 

 Advent Affiliation  Unknown

 

 Race  Unknown

 

 Ethnic Group  Unknown





Author







 Author  ARRON ALATORRE

 

 [facility]

 

 Address  [address] [place]  [address]



 

 Phone  [phone]







Care Team Providers







 Care Team Member Name  Role  Phone

 

 ARRON ALATORRE  Unavailable  [phone]







PROBLEMS







 Type  Condition  ICD9-CM Code  PEO62-KR Code  Onset Dates  Condition Status  
SNOMED Code

 

 Problem  Leukorrhea, not specified as infective  623.5        Active  214976957

 

 Problem  Screening examination for venereal disease  V74.5        Active  
347209461

 

 Problem  ADHD, predominantly inattentive type     F90.0     Active  47209555

 

 Problem  Unspecified mood [affective] disorder     F39     Active  15939286

 

 Problem  Moderate episode of recurrent major depressive disorder     F33.1     
Active  053377410

 

 Problem  Posttraumatic stress disorder     F43.10     Active  38405358

 

 Problem  Screening for substance abuse     Z13.89     Active  719339878

 

 Problem  Attention deficit hyperactivity disorder (ADHD), combined type     
F90.2     Active  792695271







ALLERGIES

No Information



ENCOUNTERS







 Encounter  Location  Date  Diagnosis

 

 [facility]  [id number] [place] [address]271-
6756     

 

 [facility]  [id number] [place] [address] 49292-
3904  27 2018   

 

 [facility]  [id number] [place] [address] 97598-
9418     

 

 [facility]  [id number] [place] [address] 63850-
4971    Encounter for supervision of normal pregnancy in third 
trimester Z34.93

 

 [facility]  [id number] [place] [address] 08513-
6917  13 2018  Third trimester pregnancy Z34.93

 

 [facility]  [id number] [place] [address] 24858-
5596  13 2018   

 

 [facility]  [address] [place] [address]-
0006    Third trimester pregnancy Z34.93

 

 [facility]  [id number] [place] [address] 53497-
6743  30 May, 2018  Elevated serum glucose R73.9 and Gestational diabetes 
mellitus (GDM) in third trimester controlled on oral hypoglycemic drug O24.419

 

 [facility]  [id number] [place] [address] [phone]
[phone]  25 May, 2018   

 

 [facility]  [id number] [place] [address] 31688-
6513  25 May, 2018   

 

 [facility]  [address] [place] [address] [phone]-
4948  23 May, 2018  Third trimester pregnancy Z34.93

 

 [facility]  [id number] [place] [address] 60108-
5515  16 May, 2018   

 

 [facility]  [address] [place] [address] [address]-
4228  09 May, 2018  Third trimester pregnancy Z34.93 and Encounter for 
immunization Z23

 

 [facility]  [id number] [place] [address] 19793-
8485  02 May, 2018  Posttraumatic stress disorder F43.10 and Unspecified mood [
affective] disorder F39

 

 [facility]  [id number] [place] [address] 00820-
7180  01 May, 2018  Posttraumatic stress disorder F43.10 ; Unspecified mood [
affective] disorder F39 and ADHD, predominantly inattentive type F90.0

 

 [facility]  [id number] [place] [address] 93719-
7313  01 May, 2018   

 

 [facility]  [id number] [place] [address] 49784-
3820    Second trimester pregnancy Z34.92

 

 [facility]  [id number] [place] [address] [phone]
[phone]     

 

 [facility]  [id number] [place] [address] 47225-
8672     

 

 [facility]  [id number] [place] [address] 89566-
1760    Screening for deficiency anemia Z13.0

 

 [facility]  [id number] [place] [address] 74454-
7873    Posttraumatic stress disorder F43.10 ; Unspecified mood [
affective] disorder F39 and ADHD, predominantly inattentive type F90.0

 

 [facility]  [id number] [place] [address] 46776-
9515    Prenatal care in second trimester Z34.92

 

 [facility]  [id number] [place] [address] 34467-
0654     

 

 [facility]  [id number] [place] [address] 45758-
4383     

 

 [facility]  [id number] [place] [address] 96632-
8159    Second trimester pregnancy Z34.92

 

 [facility]  [id number] [place] [address] 18441-
7841     

 

 [facility]  [id number] [place] [address] 39423-
8762  30 Mar, 2018  Elevated serum glucose R73.9

 

 [facility]  [id number] [place] [address] 43397-
4378  28 Mar, 2018  Unspecified mood [affective] disorder F39

 

 [facility]  [id number] [place] [address] 17730-
5541  26 Mar, 2018   

 

 [facility]  [address] [place] [address]  23 Mar, 2018  
Encounter for examination and observation for unspecified reason Z04.9

 

 [facility]  [address] [place] [address]  22 Mar, 2018  
Encounter for examination and observation for unspecified reason Z04.9

 

 [facility]  [id number] [place] [address] 11799-
8076  21 Mar, 2018  Unspecified mood [affective] disorder F39

 

 [facility]  [address]65[address] [address]-
6708  21 Mar, 2018  Elevated serum glucose R73.9

 

 [facility]  [address]65100[place] [address]-
1319  14 Mar, 2018  Prenatal care in second trimester Z34.92

 

 [facility]  [address] [place] [address] 91634-
2301  09 Mar, 2018  Screening for substance abuse Z13.89

 

 [facility]  [address]65100[place] [address]-
9862  14 2018  Second trimester pregnancy Z34.92

 

 [facility]  [address] [place] [address] 77500-
4158  14 2018   

 

 [facility]  [id number] [place] [address] 73971-
4191    Screening for deficiency anemia Z13.0

 

 [facility]  [address]65100[place] [address]-
0374     

 

 [facility]  [id number] [place] [address]-
6759     

 

 [facility]  [address] [address]-
1564    Normal pregnancy in multigravida Z34.80 and Moderate 
episode of recurrent major depressive disorder F33.1

 

 [facility]  [address]65100[place] [address]-
8175     

 

 [facility]  [address]65100[place] [address]-
7148    Posttraumatic stress disorder F43.10 ; ADHD (attention 
deficit hyperactivity disorder) evaluation Z13.89 and Moderate episode of 
recurrent major depressive disorder F33.1

 

 [facility]  [address] [address]-
2732  15 Ankit, 2017  Posttraumatic stress disorder F43.10

 

 [facility]  [address] [facility] [id number] [place] [address]-
1046    Posttraumatic stress disorder F43.10 ; ADHD (attention 
deficit hyperactivity disorder) evaluation Z13.89 and Moderate episode of 
recurrent major depressive disorder F33.1

 

 [facility]  [address] [facility] [id number] [place] [address]-
7076  30 May, 2017  Moderate episode of recurrent major depressive disorder 
F33.1 and Attention deficit hyperactivity disorder (ADHD), combined type F90.2

 

 [facility]  [address] [facility] [id number] [place] [address]-
6726  08 May, 2017  Posttraumatic stress disorder F43.10

 

 [facility]  [address] [facility] [id number] [place] [address]-
9076  08 May, 2017   

 

 [facility]  [address] [facility] [id number] [place] [address]-
6106  08 May, 2017  Posttraumatic stress disorder F43.10 ; Moderate episode of 
recurrent major depressive disorder F33.1 and Attention deficit hyperactivity 
disorder (ADHD), combined type F90.2

 

 [facility]  [address] [facility] [id number] [place] [address]-
6296  08 May, 2017  Posttraumatic stress disorder F43.10 ; ADHD (attention 
deficit hyperactivity disorder) evaluation Z13.89 and Moderate episode of 
recurrent major depressive disorder F33.1

 

 [facility]  [address] [facility] [id number] [place] [address]-
7366  02 May, 2017  Moderate episode of recurrent major depressive disorder 
F33.1 and Posttraumatic stress disorder F43.10

 

 [facility]  [address] [facility] [id number] [place] [address]-
3585  28 Mar, 2017  Posttraumatic stress disorder F43.10 ; ADHD (attention 
deficit hyperactivity disorder) evaluation Z13.89 and Moderate episode of 
recurrent major depressive disorder F33.1

 

 [facility]  [address] [facility] [id number] [place] [address]
2546     

 

 [facility]  [address] [facility] [id number] [place] [address]-
2186     

 

 [facility] [place] [facility]  [address] [id number] [place] [address]-
4037     

 

 [facility] [place] [facility]  [address] [id number] [place] [address]-
6829     

 

 [facility] [place] [facility]  [address] [id number] [place] [address]-
4777     

 

 [facility] [place] [facility]  [address] [id number] [place] [address]-
7308     

 

 [facility] [place] [facility]  [address] [id number] [place] [address]-
1751  15 Jul, 2014   

 

 [facility] [place] [facility]  [address] [id number] [place] [address]-
2939     

 

 [facility] [place] [facility]  [address] [id number] [place] [address]-
7898  10 Jul, 2014   

 

 [facility] [place] [facility]  [address] [id number] [place] [address]-
6544  10 Jul, 2014   

 

 [facility] [place] [facility]  [address] [id number] [place] [address]-
7430     

 

 [facility] [place] [facility]  [address] [id number] [place] [address]-
8614     

 

 [facility] [place] [facility]  [address] [id number] [place] [address]-
4328  11 Sep, 2012   

 

 [facility] [place] [facility]  [address] [id number] [place] [address]-
8848  14 May, 2012   

 

 [facility] [place] [facility]  [address] [id number] [place] [address]-
4322     

 

 [facility] [place] [facility]  [address] [id number] [place] [address]-
8826  28 Mar, 2012   

 

 [facility] [place] [facility]  [address] [id number] [place] [address]-
1354  20 Mar, 2012   

 

 [facility] [place] [facility]  [address] [id number] [place] [address]-
0696  19 Mar, 2012   

 

 [facility]  [id number] [address]-
4136  16 Mar, 2012   

 

 [facility]  [address]65100[place] [address]-
2326     

 

 [facility]  [address]00565100[place] [address]-
6016     

 

 [facility]  [address] [place] [address] 14713-
8296     

 

 [facility]  [address]00565100[place] [address]-
9846     

 

 [facility]  [address] [place] [address] 36274-
5356     

 

 [facility]  [address]65100[place] [address]-
9772  28 Dec, 2011   

 

 [facility]  [address]65100[place] [address]-
2545  15 Dec, 2009   

 

 [facility]  [id number] [address]-
8786  15 Dec, 2009   







IMMUNIZATIONS

No Known Immunizations



SOCIAL HISTORY

Never Assessed



REASON FOR VISIT

[facility] Hemoglobin



PLAN OF CARE





VITAL SIGNS





MEDICATIONS

Unknown Medications



RESULTS







 Name  Result  Date  Reference Range

 

 HEMOGLOBIN (IN HOUSE)     2018   

 

 HEMOGLOBIN  11.7     11.5 - 16 gm/dL

 

 Lot #  [id number]      

 

 Exp date  18      







PROCEDURES







 Procedure  Date Ordered  Result  Body Site

 

 HEMOGLOBIN  2018      







INSTRUCTIONS





MEDICATIONS ADMINISTERED

No Known Medications



MEDICAL (GENERAL) HISTORY







 Type  Description  Date

 

 Medical History  Depression   

 

 Medical History  Anxiety   

 

 Medical History  ADHD   

 

 Medical History  gestational diabetes   

 

 Surgical History  Gallbladder Removal   

 

 Surgical History  Appendectomy   

 

 Surgical History   x 2   

 

 Hospitalization History  Surgery/childbirth   

 

 Hospitalization History  Excessive vomiting  3/30/17

## 2018-06-27 NOTE — DISCHARGE INST-WOMEN'S SERVICE
Discharge Inst-Women's Serv


Depart Medication/Instructions


New, Converted or Re-Newed RX:  RX on Chart





Consults/Follow Up


Additional Follow Up:  Yes


Orders/Referrals


Dr. Schultz in 7-10 days and Dr. Davila in 6 weeks





Activity


Activity:  Activity as Tolerated


Driving Instructions:  No Driving for 1 Week


NO SMOKING:  NO SMOKING


Nothing Inside Vagina:  No Douching, No Irmo, No Tampons





Diet


Discharge Diet:  No Restrictions


Symptoms to Report to :  Bleeding Excessive, Pain Increased, Fever Over 101 

Degrees F, Vaginal Bleeding Increase, Questions/Concerns


For Any Problems or Questions:  Contact Your Physician





Skin/Wound Care


Infection Signs and Symptoms:  Increased Redness, Foul Odor of Wound, Increased 

Drainage, Skin Itchy or Has a Rash, Increased Swelling, Temperature Above 101  F


Operative Area Clean and Dry:  Keep Incision Clean/Dry


Stitches/Staples/Dermabond:  Dermabond, Care of Stitches


Bathing Instructions:  MIRTHA Joaquin DO Jun 27, 2018 5:47 pm

## 2018-06-27 NOTE — XMS REPORT
[facility]

 Created on: 2018



Sajan Donnelly

External Reference #: [id number]

: 1980

Sex: Female



Demographics







 Address  [address] [place]  [address]

 

 Preferred Language  Unknown

 

 Marital Status  Unknown

 

 Denominational Affiliation  Unknown

 

 Race  Unknown

 

 Ethnic Group  Unknown





Author







 Author  KLAUDIA GARRISON

 

 Organization  [facility]

 

 Address  [address] [place]  [address]



 

 Phone  [phone]







Care Team Providers







 Care Team Member Name  Role  Phone

 

 BLADIMIR  KLAUDIA  Unavailable  [phone]







PROBLEMS







 Type  Condition  ICD9-CM Code  HHV58-VD Code  Onset Dates  Condition Status  
SNOMED Code

 

 Problem  Screening examination for venereal disease  V74.5        Active  
072139525

 

 Problem  Unspecified mood [affective] disorder     F39     Active  73720502

 

 Problem  Screening for substance abuse     Z13.89     Active  846868380

 

 Problem  Posttraumatic stress disorder     F43.10     Active  84849081

 

 Problem  Leukorrhea, not specified as infective  623.5        Active  171429841

 

 Problem  Attention deficit hyperactivity disorder (ADHD), combined type     
F90.2     Active  178982889

 

 Problem  Moderate episode of recurrent major depressive disorder     F33.1     
Active  540407798







ALLERGIES

No Information



ENCOUNTERS







 Encounter  Location  Date  Diagnosis

 

 [place] [id number] [place] [address]-
4032     

 

 [place] [address] [address]-
7154     

 

 [place] [id number] [place] [address] 62[address]-
3605     

 

 [place] [id number] [place] [address] 98349-
0017  30 Mar, 2018  Elevated serum glucose R73.9

 

 [place] [id number] [place] [address] 04942-
9614  28 Mar, 2018   

 

 [place] [address] [address]-
4836  26 Mar, 2018   

 

 [facility]  [address] [place] [address]  23 Mar, 2018  
Encounter for examination and observation for unspecified reason Z04.9

 

 [facility]  [address] [place] [address]  22 Mar, 2018  
Encounter for examination and observation for unspecified reason Z04.9

 

 [place] [id number] [place] [address] 88143-
1585  21 Mar, 2018  Unspecified mood [affective] disorder F39

 

 [place] [id number] [place] [address] 38[address]-
5633  21 Mar, 2018  Elevated serum glucose R73.9

 

 [place] [id number] [place] [address] 36908-
2523  14 Mar, 2018  Prenatal care in second trimester Z34.92

 

 [place] [id number] [place] [address] 96089-
7688  09 Mar, 2018  Screening for substance abuse Z13.89

 

 [place] [id number] [place] [address] 23786-
2396  14 2018  Second trimester pregnancy Z34.92

 

 [place] [id number] [place] [address] 09547-
0154  14 2018   

 

 [place] [id number] [place] [address] 06738-
7527    Screening for deficiency anemia Z13.0

 

 [place] [id number] [place] [address] 02144-
7731     

 

 [place] [id number] [place] [address] 69801-
0031     

 

 [place] [id number] [place] [address] 40000-
6630    Normal pregnancy in multigravida Z34.80 and Moderate 
episode of recurrent major depressive disorder F33.1

 

 [place] [id number] [place] [address] 48468-
8289     

 

 [place] [id number] [place] [address] 60571-
7108    Posttraumatic stress disorder F43.10 ; ADHD (attention 
deficit hyperactivity disorder) evaluation Z13.89 and Moderate episode of 
recurrent major depressive disorder F33.1

 

 [facility]  [address] [facility] [id number] [address]
5186  15 2017  Posttraumatic stress disorder F43.10

 

 [facility]  [id number], [place] [address]-
6686    Posttraumatic stress disorder F43.10 ; ADHD (attention 
deficit hyperactivity disorder) evaluation Z13.89 and Moderate episode of 
recurrent major depressive disorder F33.1

 

 [facility]  [id number], [place] [address]-
3658  30 May, 2017  Moderate episode of recurrent major depressive disorder 
F33.1 and Attention deficit hyperactivity disorder (ADHD), combined type F90.2

 

 [facility]  [id number] [address]-
0176  08 May, 2017  Posttraumatic stress disorder F43.10

 

 [facility]  [id number], [place] [address]-
5186  08 May, 2017   

 

 [facility]  [id number], [place] [address]-
6863  08 May, 2017  Posttraumatic stress disorder F43.10 ; Moderate episode of 
recurrent major depressive disorder F33.1 and Attention deficit hyperactivity 
disorder (ADHD), combined type F90.2

 

 [facility]  [id number], [place] [address]-
5093  08 May, 2017  Posttraumatic stress disorder F43.10 ; ADHD (attention 
deficit hyperactivity disorder) evaluation Z13.89 and Moderate episode of 
recurrent major depressive disorder F33.1

 

 [facility]  [address] [facility] [id number] [address]-
4316  02 May, 2017  Moderate episode of recurrent major depressive disorder 
F33.1 and Posttraumatic stress disorder F43.10

 

 [facility]  [id number] [place] [address]-
4804  28 Mar, 2017  Posttraumatic stress disorder F43.10 ; ADHD (attention 
deficit hyperactivity disorder) evaluation Z13.89 and Moderate episode of 
recurrent major depressive disorder F33.1

 

 [place] [address] [id number] [place] [address]-
6921  14 2015   

 

 [facility] [place] [facility]  [address] [id number] [place] [address]-
8178     

 

 [facility] [place] [facility]  [address] [id number] [place] [address]-
4430     

 

 [facility] [place] [facility]  [address] [id number] [place] [address]-
2286     

 

 [facility] [place] [facility]  [address] [id number] [place] [address]-
5549     

 

 [facility] [place] [facility]  [address] [id number] [place] [address]-
2223     

 

 [facility] [place] [facility]  [address] [id number] [place] [address]-
9921  15 Jul, 2014   

 

 [facility] [place] [facility]  [address] [id number] [place] [address]-
4871     

 

 [facility] [place] [facility]  [address] [id number] [place] [address]-
9330  10 Jul, 2014   

 

 [facility] [place] [facility]  [address] [id number] [place] [address]-
1603  10 Jul, 2014   

 

 [facility] [place] [facility]  [address] [id number] [place] [address]-
0304     

 

 [facility] [place] [facility]  [address] [id number] [place] [address]-
8287     

 

 [facility] [place] [facility]  [address] [id number] [place] [address]-
6514  11 Sep, 2012   

 

 [facility] [place] [facility]  [address] [id number] [place] [address]-
1722  14 May, 2012   

 

 [facility] [place] [facility]  [address] [id number] [place] [address]-
8870     

 

 [facility] [place] [facility]  [address] [id number] [place] [address]-
5483  28 Mar, 2012   

 

 [facility]  [address] [id number], [place] [address]-
3425  20 Mar, 2012   

 

 [facility]  [address]00565100[place], [place] [address]-
4236  19 Mar, 2012   

 

 [facility]  [address]65100[place], [place] [address]-
8266  16 Mar, 2012   

 

 [facility]  [address]00565100[place], [place] [address]-
4476     

 

 [facility]  [address]65100[place], [place] [address]-
6172     

 

 [facility]  [address]65100[place], [place] [address]-
6806     

 

 [facility]  [address]65100[place], [place] [address]-
0036     

 

 [facility]  [address]65100[place], [place] [address]-
1895     

 

 [facility]  [address]00565100[place], [place] [address]-
1351  28 Dec, 2011   

 

 [facility]  [address]65100[place], [place] [address]-
5824  15 Dec, 2009   

 

 [facility]  [address] [id number], [place] [address]-
3459  15 Dec, 2009   







IMMUNIZATIONS

No Known Immunizations



SOCIAL HISTORY

Never Assessed



REASON FOR VISIT

 f/u



PLAN OF CARE







 Activity  Details









  









 Follow Up  4 Weeks Reason:PTSD, ADHD, Depression







VITAL SIGNS





MEDICATIONS

Unknown Medications



RESULTS

No Results



PROCEDURES







 Procedure  Date Ordered  Result  Body Site

 

 Psychotherapy, patient &/family, 30 minutes, established patient  2017      







INSTRUCTIONS





MEDICATIONS ADMINISTERED

No Known Medications



MEDICAL (GENERAL) HISTORY







 Type  Description  Date

 

 Medical History  Depression   

 

 Medical History  Anxiety   

 

 Medical History  ADHD   

 

 Surgical History  Gallbladder Removal   

 

 Surgical History  Appendectomy   

 

 Surgical History   x 2   

 

 Hospitalization History  Surgery/childbirth

## 2018-06-27 NOTE — HISTORY & PHYSICAL-OB
OB - Chief Complaint & HPI


Date/Time


Date of Admission:


Date of Admission:  2018 at 06:05


Time Seen by Provider:  07:05





Chief Complaint/History


OB-Reason for Admission/Chief:   Section


Hx :  3


Hx Para:  2


Expected Date of Delivery:  2018


Gestational Age in Weeks:  36


Gestational Age in Days:  4


Indication for :  desires repeat 


Other reason for admission:


Delivery due to borderline oligo, GDM, and AMA


Admission Nurse Assessment Rev:  Yes


History of Labs


A pos


Antibody neg


RI


RPR NR


HBsAg NR


HIV NR


GC neg





Allergies and Home Medications


Allergies


Coded Allergies:  


     No Known Drug Allergies (Unverified , 18)





Home Medications


Metformin HCl 500 Mg Tablet, 1,000 PO BID, (Reported)


   take 2 in am and 1 at night 


Eug365/Iron Fumarate/FA/Dss 1 Each Tablet, 1 EACH PO DAILY, (Reported)





Patient Home Medication List


Home Medication List Reviewed:  Yes





OB - History


Hx of Present Pregnancy


Prenatal Care:  Yes


Ultrasounds:  Normal mid trimester US, Other (most recent US 5 cm DEE, limited 

imaging due to body hab)


Obstetrical Complications:  Gestational Diabetes


Medical Complications:  Other (AMA)





Delivery History


Hx Blood Disorders:  No


Adverse Rxn to Tranfusion:  No (N/A)





Patient Past Medical History





BMI 37





Social History/Family History


HIV/AIDS:  No


Recent Infectious Disease Expo:  No


Sexually Transmitted Disease:  No


Alcohol Use:  Denies Use


Recreational Drug Use:  No





OB - Admission Exam


Physical Exam


HEENT:  NCAT


Heart:  Rhythm Normal


Lungs:  Clear


Abdomen:  Gravid


Extremities:  Normal


Reflexes:  Normal


Fetal Heart Rate:  130's


Accelerations:  Accelerations Present


Decelerations:  No Decelerations


Short Term Variability:  Present


Long Term Variability:  Average (6-25)


Contractions on Admission:  >10 Minutes Apart


Intensity:  Mild


Labs





Laboratory Tests








Test


 18


06:25 18


06:36 Range/Units


 


 


White Blood Count


 10.4 


 


 4.3-11.0


10^3/uL


 


Red Blood Count


 4.43 


 


 4.35-5.85


10^6/uL


 


Hemoglobin 12.5   11.5-16.0  G/DL


 


Hematocrit 36   35-52  %


 


Mean Corpuscular Volume 82   80-99  FL


 


Mean Corpuscular Hemoglobin 28   25-34  PG


 


Mean Corpuscular Hemoglobin


Concent 35 


 


 32-36  G/DL





 


Red Cell Distribution Width 14.2   10.0-14.5  %


 


Platelet Count


 268 


 


 130-400


10^3/uL


 


Mean Platelet Volume 11.2 H  7.4-10.4  FL


 


Neutrophils (%) (Auto) 62   42-75  %


 


Lymphocytes (%) (Auto) 29   12-44  %


 


Monocytes (%) (Auto) 7   0-12  %


 


Eosinophils (%) (Auto) 1   0-10  %


 


Basophils (%) (Auto) 0   0-10  %


 


Neutrophils # (Auto) 6.5   1.8-7.8  X 10^3


 


Lymphocytes # (Auto) 3.1   1.0-4.0  X 10^3


 


Monocytes # (Auto) 0.7   0.0-1.0  X 10^3


 


Eosinophils # (Auto)


 0.1 


 


 0.0-0.3


10^3/uL


 


Basophils # (Auto)


 0.0 


 


 0.0-0.1


10^3/uL


 


Glucometer  84    MG/DL











OB - Assessment/Plan/Diagnosis


Assessment


Assessment:   section


Admission Dx


38 yo  @ 37.4


Oligohydramnios


AMA


GDMA-2


BMI 37


Admission Status:  Inpatient Order (span 2 midnights)


Reason for Inpatient Admission:  


38 yo  @ 37.4


Oligohydramnios


AMA


GDMA-2


BMI 37





Plan


Plan:   Section





Discharge Diagnosis


Diagnosis:  


POD 2 MIRTHA DEVLIN DO 2018 07:20

## 2018-06-27 NOTE — XMS REPORT
[facility]

 Created on: 2018



Sajan Donnelly

External Reference #: [id number]

: 1980

Sex: Female



Demographics







 Address  [address] [place]  [address]

 

 Preferred Language  Unknown

 

 Marital Status  Unknown

 

 Protestant Affiliation  Unknown

 

 Race  Unknown

 

 Ethnic Group  Unknown





Author







 Author  KLAUDIA GARRISON

 

 Organization  [facility]

 

 Address  [address] [place]  [address]



 

 Phone  [phone]







Care Team Providers







 Care Team Member Name  Role  Phone

 

 KLAUDIA GARRISON  Unavailable  [phone]







PROBLEMS







 Type  Condition  ICD9-CM Code  UDB35-MK Code  Onset Dates  Condition Status  
SNOMED Code

 

 Problem  Screening examination for venereal disease  V74.5        Active  
351000508

 

 Problem  Unspecified mood [affective] disorder     F39     Active  89353087

 

 Problem  Screening for substance abuse     Z13.89     Active  864149789

 

 Problem  Posttraumatic stress disorder     F43.10     Active  06312324

 

 Problem  Leukorrhea, not specified as infective  623.5        Active  908174176

 

 Problem  Attention deficit hyperactivity disorder (ADHD), combined type     
F90.2     Active  332047475

 

 Problem  Moderate episode of recurrent major depressive disorder     F33.1     
Active  703963098







ALLERGIES

No Information



ENCOUNTERS







 Encounter  Location  Date  Diagnosis

 

 [place] [address] [address]-
0087     

 

 [place] [address] [address]-
2328     

 

 [facility]  [id number] [place] [address] 868[address]-
1456     

 

 [facility]  [id number] [place] [address] 238[address]-
6832     

 

 [facility]  [id number] [place] [address] 87186-
8193  30 Mar, 2018  Elevated serum glucose R73.9

 

 [facility]  [address] [address]-
7795  28 Mar, 2018   

 

 [facility]  [id number] [place] [address] 69937-
0427  26 Mar, 2018   

 

 [place] [address] [place] [address]  23 Mar, 2018  
Encounter for examination and observation for unspecified reason Z04.9

 

 [facility]  [address] [place] [address]  22 Mar, 2018  
Encounter for examination and observation for unspecified reason Z04.9

 

 [facility]  [address] [place] [address] 220[address]-
3343  21 Mar, 2018  Unspecified mood [affective] disorder F39

 

 [facility]  [id number] [place] [address] 06353-
6324  21 Mar, 2018  Elevated serum glucose R73.9

 

 [facility]  [id number] [place] [address] 17676-
5746  14 Mar, 2018  Prenatal care in second trimester Z34.92

 

 [place] [address] [id number] [place] [address] 34988-
9691  09 Mar, 2018  Screening for substance abuse Z13.89

 

 [place] [address] [id number] [place] [address] 83[address]-
8578  14 2018  Second trimester pregnancy Z34.92

 

 [facility]  [id number] [place] [address]-
9477  14 2018   

 

 [facility]  [id number] [place] [address]-
8951    Screening for deficiency anemia Z13.0

 

 [facility]  [id number] [place] [address] 870[address]-
2446     

 

 [facility]  [id number] [place] [address] 09402-
1813     

 

 [facility]  [id number] [place] [address] 211[address]-
6732    Normal pregnancy in multigravida Z34.80 and Moderate 
episode of recurrent major depressive disorder F33.1

 

 [facility]  [address] [place] [address] 72985-
9042     

 

 [facility]  [address] [place] [address], [place] [address]-
2779    Posttraumatic stress disorder F43.10 ; ADHD (attention 
deficit hyperactivity disorder) evaluation Z13.89 and Moderate episode of 
recurrent major depressive disorder F33.1

 

 [facility]  [address]100[place], [place] [address]-
7521  15 Ankit, 2017  Posttraumatic stress disorder F43.10

 

 [facility]  [address]100[place], [place] [address]-
8009    Posttraumatic stress disorder F43.10 ; ADHD (attention 
deficit hyperactivity disorder) evaluation Z13.89 and Moderate episode of 
recurrent major depressive disorder F33.1

 

 [facility]  [address], [place] [address]-
1275  30 May, 2017  Moderate episode of recurrent major depressive disorder 
F33.1 and Attention deficit hyperactivity disorder (ADHD), combined type F90.2

 

 [facility]  [address], [place] [address]-
7555  08 May, 2017  Posttraumatic stress disorder F43.10

 

 [facility]  [address]100[place], [place] [address]-
4308  08 May, 2017   

 

 [facility]  [address], [place] [address]-
4335  08 May, 2017  Posttraumatic stress disorder F43.10 ; Moderate episode of 
recurrent major depressive disorder F33.1 and Attention deficit hyperactivity 
disorder (ADHD), combined type F90.2

 

 [facility]  [address], [place] [address]-
3731  08 May, 2017  Posttraumatic stress disorder F43.10 ; ADHD (attention 
deficit hyperactivity disorder) evaluation Z13.89 and Moderate episode of 
recurrent major depressive disorder F33.1

 

 [facility]  [address], [place] [address]-
4919  02 May, 2017  Moderate episode of recurrent major depressive disorder 
F33.1 and Posttraumatic stress disorder F43.10

 

 [facility]  [id number], [place] [address]-
4573  28 Mar, 2017  Posttraumatic stress disorder F43.10 ; ADHD (attention 
deficit hyperactivity disorder) evaluation Z13.89 and Moderate episode of 
recurrent major depressive disorder F33.1

 

 [facility]  [address] [id number] [place] [address]-
5036  14 2015   

 

 [facility]  [address] [id number] [place] [address]-
0646     

 

 [facility]  [address] [id number] [place] [address]-
6955     

 

 [facility]  [address] [id number] [place] [address]-
6384     

 

 [facility]  [address] [id number] [place] [address]-
1910     

 

 [facility]  [address] [facility] [id number] [place] [address]-
2994     

 

 [facility]  [address] [facility] [id number] [place] [address]-
8587  15 Jul, 2014   

 

 [facility]  [address] [facility] [id number] [place] [address]-
9487     

 

 [facility]  [address] [facility] [id number] [place] [address]-
3804  10 Jul, 2014   

 

 [facility]  [address] [facility] [id number] [place] [address]-
9457  10 Jul, 2014   

 

 [facility]  [address] [facility] [id number] [address]-
0048     

 

 [facility]  [address] [id number] [place] [address]-
9737     

 

 [facility]  [address] [facility] [id number] [place] [address]-
3238  11 Sep, 2012   

 

 [facility]  [address] [facility] [id number] [place] [address]-
7453  14 May, 2012   

 

 [facility]  [address] [facility] [id number] [place] [address]-
3336     

 

 [facility]  [id number], [place] [address]-
9256  28 Mar, 2012   

 

 [facility]  [address]65100[place], [place] [address]-
2546  20 Mar, 2012   

 

 [facility]  [address]100[place], [place] [address]-
2546  19 Mar, 2012   

 

 [facility]  [address]100[place], [place] [address]-
2546  16 Mar, 2012   

 

 [facility]  [address]65100[place], [place] [address]-
2546     

 

 [facility]  [address]100[place], [place] [address]-
2546     

 

 [facility]  [address]65100[place], [place] [address]-
2546     

 

 [facility]  [address]65100[place], [place] [address]-
2546     

 

 [facility]  [address]65100[place], [place] [address]-
0056     

 

 [facility]  [address]65100[place], [place] [address]-
2286  28 Dec, 2011   

 

 [facility]  [id number], [place] [address]-
0746  15 Dec, 2009   

 

 [facility]  [address]100[place], [place] [address]-
2546  15 Dec, 2009   







IMMUNIZATIONS

No Known Immunizations



SOCIAL HISTORY

Never Assessed



REASON FOR VISIT

 f/u



PLAN OF CARE







 Activity  Details









  









 Follow Up  1 Week Reason:Depression







VITAL SIGNS





MEDICATIONS

Unknown Medications



RESULTS

No Results



PROCEDURES







 Procedure  Date Ordered  Result  Body Site

 

 Psychotherapy, patient &/family, 30 minutes, established patient  2017      







INSTRUCTIONS





MEDICATIONS ADMINISTERED

No Known Medications



MEDICAL (GENERAL) HISTORY







 Type  Description  Date

 

 Medical History  Depression   

 

 Medical History  Anxiety   

 

 Medical History  ADHD   

 

 Surgical History  Gallbladder Removal   

 

 Surgical History  Appendectomy   

 

 Surgical History   x 2   

 

 Hospitalization History  Surgery/childbirth

## 2018-06-27 NOTE — XMS REPORT
[facility]

 Created on: 2018



Sajan Donnelly

External Reference #: [id number]

: 1980

Sex: Female



Demographics







 Address  [address] [place]  [address]

 

 Preferred Language  Unknown

 

 Marital Status  Unknown

 

 Scientologist Affiliation  Unknown

 

 Race  Unknown

 

 Ethnic Group  Unknown





Author







 Author  ARRON ALATORRE

 

 [facility]

 

 Address  [address] [place]  [address]



 

 Phone  [phone]







Care Team Providers







 Care Team Member Name  Role  Phone

 

 ARRON ALATORRE  Unavailable  [phone]







PROBLEMS







 Type  Condition  ICD9-CM Code  SSD89-ZW Code  Onset Dates  Condition Status  
SNOMED Code

 

 Problem  Leukorrhea, not specified as infective  623.5        Active  422095235

 

 Problem  Screening examination for venereal disease  V74.5        Active  
864050962

 

 Problem  ADHD, predominantly inattentive type     F90.0     Active  19103644

 

 Problem  Unspecified mood [affective] disorder     F39     Active  87037178

 

 Problem  Moderate episode of recurrent major depressive disorder     F33.1     
Active  259840566

 

 Problem  Posttraumatic stress disorder     F43.10     Active  29035470

 

 Problem  Screening for substance abuse     Z13.89     Active  831445002

 

 Problem  Attention deficit hyperactivity disorder (ADHD), combined type     
F90.2     Active  231619477







ALLERGIES

No Known Allergies



ENCOUNTERS







 Encounter  Location  Date  Diagnosis

 

 [place] [id number] [place] [address] 17074-
2426  31 2018   

 

 [facility]  [address] [place] [address] 21049-
0235  27 2018   

 

 [facility]  [id number] [place] [address] 72767-
1225     

 

 [facility]  [id number] [place] [address] 40755-
4447    Encounter for supervision of normal pregnancy in third 
trimester Z34.93

 

 [facility]  [id number] [place] [address] 11457-
3400  13 2018  Third trimester pregnancy Z34.93

 

 [facility]  [id number] [place] [address] 84041-
4045  13 2018   

 

 [facility]  [id number] [place] [address]-
9684    Third trimester pregnancy Z34.93

 

 [facility]  [id number] [place] [address] 32065-
8401  30 May, 2018  Elevated serum glucose R73.9 and Gestational diabetes 
mellitus (GDM) in third trimester controlled on oral hypoglycemic drug O24.419

 

 [facility]  [id number] [place] [address] 53830
-[phone]  25 May, 2018   

 

 [facility]  [id number] [place] [address] 28865-
1150  25 May, 2018   

 

 [facility]  [address] [address]-
1115  23 May, 2018  Third trimester pregnancy Z34.93

 

 [facility]  [id number] [place] [address] 58348-
8370  16 May, 2018   

 

 [facility]  [id number] [place] [address] 81126-
2000  09 May, 2018  Third trimester pregnancy Z34.93 and Encounter for 
immunization Z23

 

 [facility]  [id number] [place] [address] 07592-
5999  02 May, 2018  Posttraumatic stress disorder F43.10 and Unspecified mood [
affective] disorder F39

 

 [facility]  [id number] [place] [address] 12912-
9924  01 May, 2018  Posttraumatic stress disorder F43.10 ; Unspecified mood [
affective] disorder F39 and ADHD, predominantly inattentive type F90.0

 

 [facility]  [address] [place] [address] 27224-
9555  01 May, 2018   

 

 [facility]  [id number] [place] [address] 06079-
6812    Second trimester pregnancy Z34.92

 

 [facility]  [id number] [place] [address] 83670
[phone]     

 

 [facility]  [id number] [place] [address] 13085-
8557     

 

 [facility]  [id number] [place] [address] 01931-
4260    Screening for deficiency anemia Z13.0

 

 [facility]  [id number] [place] [address] 61100-
7352    Posttraumatic stress disorder F43.10 ; Unspecified mood [
affective] disorder F39 and ADHD, predominantly inattentive type F90.0

 

 [facility]  [id number] [place] [address] 25204-
4654    Prenatal care in second trimester Z34.92

 

 [facility]  [id number] [place] [address] 93896-
1103     

 

 [facility]  [id number] [place] [address] 13689-
1434     

 

 [facility]  [id number] [place] [address] 60405-
2494    Second trimester pregnancy Z34.92

 

 [facility]  [id number] [place] [address] 48768-
9327     

 

 [facility]  [id number] [place] [address] 04511-
8892  30 Mar, 2018  Elevated serum glucose R73.9

 

 [facility]  [id number] [place] [address] 63995-
2965  28 Mar, 2018  Unspecified mood [affective] disorder F39

 

 [facility]  [id number] [place] [address] 05677-
7139  26 Mar, 2018   

 

 [facility]  [address] [place] [address]  23 Mar, 2018  
Encounter for examination and observation for unspecified reason Z04.9

 

 [facility]  [address] [place] [address]  22 Mar, 2018  
Encounter for examination and observation for unspecified reason Z04.9

 

 [facility]  [id number] [place] [address] 00811-
3160  21 Mar, 2018  Unspecified mood [affective] disorder F39

 

 [facility]  [address] [address]-
1652  21 Mar, 2018  Elevated serum glucose R73.9

 

 [facility]  [address] [address]-
7346  14 Mar, 2018  Prenatal care in second trimester Z34.92

 

 [facility]  [id number] [place] [address] 40860-
9126  09 Mar, 2018  Screening for substance abuse Z13.89

 

 [facility]  [address] [address]-
8353  14 2018  Second trimester pregnancy Z34.92

 

 [facility]  [address] [place] [address] 38388-
9166  14 2018   

 

 [facility]  [id number] [place] [address] 47167-
5581    Screening for deficiency anemia Z13.0

 

 [facility]  [address] [address]-
7580     

 

 [facility]  [id number] [place] [address] 48680-
5109     

 

 [facility]  [address] [address]-
0202    Normal pregnancy in multigravida Z34.80 and Moderate 
episode of recurrent major depressive disorder F33.1

 

 [facility]  [address] [address]-
2885     

 

 [facility]  [address] [place] [address] 67637-
7800    Posttraumatic stress disorder F43.10 ; ADHD (attention 
deficit hyperactivity disorder) evaluation Z13.89 and Moderate episode of 
recurrent major depressive disorder F33.1

 

 [facility]  [address] [address]-
5645  15 Ankit, 2017  Posttraumatic stress disorder F43.10

 

 [facility]  [address] [facility] [id number] [place] [address]-
9026    Posttraumatic stress disorder F43.10 ; ADHD (attention 
deficit hyperactivity disorder) evaluation Z13.89 and Moderate episode of 
recurrent major depressive disorder F33.1

 

 [facility]  [address] [facility] [id number] [place] [address]-
2696  30 May, 2017  Moderate episode of recurrent major depressive disorder 
F33.1 and Attention deficit hyperactivity disorder (ADHD), combined type F90.2

 

 [facility]  [address] [id number] [place] [address]-
7646  08 May, 2017  Posttraumatic stress disorder F43.10

 

 [facility]  [address] [facility] [id number] [place] [address]-
7806  08 May, 2017   

 

 [facility]  [address] [facility] [id number] [place] [address]-
4486  08 May, 2017  Posttraumatic stress disorder F43.10 ; Moderate episode of 
recurrent major depressive disorder F33.1 and Attention deficit hyperactivity 
disorder (ADHD), combined type F90.2

 

 [facility]  [address] [facility] [id number] [place] [address]-
3711  08 May, 2017  Posttraumatic stress disorder F43.10 ; ADHD (attention 
deficit hyperactivity disorder) evaluation Z13.89 and Moderate episode of 
recurrent major depressive disorder F33.1

 

 [facility]  [address] [facility] [id number] [place] [address]-
0306  02 May, 2017  Moderate episode of recurrent major depressive disorder 
F33.1 and Posttraumatic stress disorder F43.10

 

 [facility]  [address] [facility] [id number] [place] [address]-
7250  28 Mar, 2017  Posttraumatic stress disorder F43.10 ; ADHD (attention 
deficit hyperactivity disorder) evaluation Z13.89 and Moderate episode of 
recurrent major depressive disorder F33.1

 

 [facility]  [address] [facility] [id number] [place] [address]-
5036     

 

 [facility]  [address] [facility] [id number] [place] [address]-
0783     

 

 [facility] [place] [facility]  [address] [id number] [place] [address]-
5560     

 

 [facility] [place] [facility]  [address] [id number] [place] [address]-
9332     

 

 [facility] [place] [facility]  [address] [id number] [place] [address]-
1474     

 

 [facility] [place] [facility]  [address] [id number] [place] [address]-
5644     

 

 [facility] [place] [facility]  [address] [id number] [place] [address]-
6681  15 Jul, 2014   

 

 [facility] [place] [facility]  [address] [id number] [place] [address]-
2408     

 

 [facility] [place] [facility]  [address] [id number] [place] [address]-
7904  10 Jul, 2014   

 

 [facility] [place] [facility]  [address] [id number] [place] [address]-
5640  10 Jul, 2014   

 

 [facility] [place] [facility]  [address] [id number] [place] [address]-
8664     

 

 [facility] [place] [facility]  [address] [id number] [place] [address]-
7399     

 

 [facility] [place] [facility]  [address] [id number] [place] [address]-
2123  11 Sep, 2012   

 

 [facility] [place] [facility]  [address] [id number] [place] [address]-
2759  14 May, 2012   

 

 [facility] [place] [facility]  [address] [id number] [place] [address]-
9007     

 

 [facility] [place] [facility]  [address] [id number] [place] [address]-
1786  28 Mar, 2012   

 

 [facility] [place] [facility]  [address] [id number] [place] [address]-
6236  20 Mar, 2012   

 

 [facility] [place] [facility]  [address] [id number] [address]-
0816  19 Mar, 2012   

 

 [facility]  [address] [address]-
4714  16 Mar, 2012   

 

 [facility]  [address] [address]-
9735     

 

 [facility]  [address] [address]-
6110     

 

 [facility]  [address], [place] [address]-
9123     

 

 [facility]  [address] [address]-
7580     

 

 [facility]  [address] [address]-
8944     

 

 [facility]  [address] [address]-
3250  28 Dec, 2011   

 

 [facility]  [address], [place] [address]-
9287  15 Dec, 2009   

 

 [facility]  [address] [address]-
2101  15 Dec, 2009   







IMMUNIZATIONS

No Known Immunizations



SOCIAL HISTORY

Never Assessed



REASON FOR VISIT

[id number], Concerned about weight



PLAN OF CARE







 Activity  Details









  









 Follow Up  4 Weeks, 4 Weeks Reason:







VITAL SIGNS







 Height  66 in  2018

 

 Weight  226.0 lbs  2018

 

 Temperature  98.3 degrees Fahrenheit  2018

 

 Heart Rate  80 bpm  2018

 

 Respiratory Rate  20   2018

 

 BMI  36.477 kg/m2  2018

 

 Blood pressure systolic  112 mmHg  2018

 

 Blood pressure diastolic  84 mmHg  2018







MEDICATIONS







 Medication  Instructions  Dosage  Frequency  Start Date  End Date  Duration  
Status

 

 Trintellix 20 mg  Orally twice a day  1 tablet  12h           Not-Taking

 

 Xanax 1 MG  Orally Once a day at bedtime  1 tablet              Not-Taking

 

 Adderall 20 mg  Orally twice a day  1 tablet  12h  08 May, 2017     28 days  
Not-Taking

 

 Diflucan 150 mg     take 1 tablet by Oral route once  1 time per day repeat in 
3 days             Not-Taking

 

 Adderall 20 MG  Orally Once a day  1 tablet in the morning  24h           Not-
Taking

 

 Trileptal 300 MG  Orally twice a day  1 tablet  12h  08 May, 2017     90 days  
Not-Taking

 

 Pre-                    Active

 

 Phentermine HCl 37.5 MG  Orally Once a day  1 tablet  24h           Not-Taking

 

 Fluvoxamine Maleate 50 mg  Orally Twice a day  .5 tablet  12h           Not-
Taking







RESULTS

No Results



PROCEDURES







 Procedure  Date Ordered  Result  Body Site

 

 COMPLETE CBC W/AUTO DIFF WBC  2018      

 

 SPECIMEN HANDLING  2018      

 

 CULTURE, BACTERIA, OTHER  2018      

 

 URINE CULTURE/COLONY COUNT  2018      

 

 RBC ANTIBODY SCREEN  2018      

 

 BLOOD TYPING, ABO  2018      

 

 BLOOD TYPING, RH (D)  2018      

 

 ASSAY THYROID STIM HORMONE  2018      

 

 RUBELLA ANTIBODY  2018      

 

 No Charge  2018      

 

 ALPHA-FETOPROTEIN, SERUM  2018      

 

 INHIBIN A  2018      

 

 TRICHOMONAS ASSAY W/OPTIC  2018      

 

 ASSAY OF ESTRIOL  2018      

 

 CHORIONIC GONADOTROPIN TEST  2018      

 

 URINALYSIS, AUTO, W/O SCOPE  2018      

 

 VENIPUNCT, ROUTINE*  2018      







INSTRUCTIONS





MEDICATIONS ADMINISTERED

No Known Medications



MEDICAL (GENERAL) HISTORY







 Type  Description  Date

 

 Medical History  Depression   

 

 Medical History  Anxiety   

 

 Medical History  ADHD   

 

 Medical History  gestational diabetes   

 

 Surgical History  Gallbladder Removal   

 

 Surgical History  Appendectomy   

 

 Surgical History   x 2   

 

 Hospitalization History  Surgery/childbirth   

 

 Hospitalization History  Excessive vomiting  3/30/17

## 2018-06-27 NOTE — XMS REPORT
[facility]

 Created on: 2018



Sajan Donnelly

External Reference #: [id number]

: 1980

Sex: Female



Demographics







 Address  [address] [place]  [address]

 

 Preferred Language  Unknown

 

 Marital Status  Unknown

 

 Temple Affiliation  Unknown

 

 Race  Unknown

 

 Ethnic Group  Unknown





Author







 Author  ARRON ALATORRE

 

 [facility]

 

 Address  [address] [place]  [address]



 

 Phone  [phone]







Care Team Providers







 Care Team Member Name  Role  Phone

 

 ARRON ALATORRE  Unavailable  [phone]







PROBLEMS







 Type  Condition  ICD9-CM Code  GIF18-QJ Code  Onset Dates  Condition Status  
SNOMED Code

 

 Problem  Leukorrhea, not specified as infective  623.5        Active  667409865

 

 Problem  Screening examination for venereal disease  V74.5        Active  
901272512

 

 Problem  ADHD, predominantly inattentive type     F90.0     Active  17286060

 

 Problem  Unspecified mood [affective] disorder     F39     Active  01866361

 

 Problem  Moderate episode of recurrent major depressive disorder     F33.1     
Active  101554866

 

 Problem  Posttraumatic stress disorder     F43.10     Active  81182594

 

 Problem  Screening for substance abuse     Z13.89     Active  351087892

 

 Problem  Attention deficit hyperactivity disorder (ADHD), combined type     
F90.2     Active  031959635







ALLERGIES

No Information



ENCOUNTERS







 Encounter  Location  Date  Diagnosis

 

 [facility]  [id number] [place] [phone]-
[phone]     

 

 [facility]  [id number] [place] [address] [phone]-
7955  27 2018   

 

 [facility]  [id number] [place] [address] 81464-
3384     

 

 [facility]  [id number] [place] [address] 64122-
8836    Encounter for supervision of normal pregnancy in third 
trimester Z34.93

 

 [facility]  [id number] [place] [address] 94605-
5755  13 2018  Third trimester pregnancy Z34.93

 

 [facility]  [id number] [place] [address] 01956-
5579  13 2018   

 

 [facility]  [address] [place] [address]
7302    Third trimester pregnancy Z34.93

 

 [facility]  [id number] [place] [address] 83415-
8054  30 May, 2018  Elevated serum glucose R73.9 and Gestational diabetes 
mellitus (GDM) in third trimester controlled on oral hypoglycemic drug O24.419

 

 [facility]  [id number] [place] [address] [phone]
[phone]  25 May, 2018   

 

 [facility]  [id number] [place] [address] [phone]-
4093  25 May, 2018   

 

 [facility]  [address] [address]-
4383  23 May, 2018  Third trimester pregnancy Z34.93

 

 [facility]  [id number] [place] [address] 53478-
4669  16 May, 2018   

 

 [facility]  [address] [address]-
7529  09 May, 2018  Third trimester pregnancy Z34.93 and Encounter for 
immunization Z23

 

 [facility]  [id number] [place] [address] 40932-
6790  02 May, 2018  Posttraumatic stress disorder F43.10 and Unspecified mood [
affective] disorder F39

 

 [facility]  [id number] [place] [address] 03862-
5279  01 May, 2018  Posttraumatic stress disorder F43.10 ; Unspecified mood [
affective] disorder F39 and ADHD, predominantly inattentive type F90.0

 

 [facility]  [id number] [place] [address] 58009-
6231  01 May, 2018   

 

 [facility]  [id number] [place] [address] 42767-
4699    Second trimester pregnancy Z34.92

 

 [facility]  [id number] [place] [address] [phone]
[phone]     

 

 [facility]  [id number] [place] [address] 10564-
3394     

 

 [facility]  [id number] [place] [address] [phone]-
3176    Screening for deficiency anemia Z13.0

 

 [facility]  [id number] [place] [address] 54670-
8530    Posttraumatic stress disorder F43.10 ; Unspecified mood [
affective] disorder F39 and ADHD, predominantly inattentive type F90.0

 

 [facility]  [id number] [place] [address] 14913-
7377    Prenatal care in second trimester Z34.92

 

 [facility]  [id number] [place] [address] 05663-
0680     

 

 [facility]  [id number] [place] [address] 94248-
6771     

 

 [facility]  [id number] [place] [address] 97014-
3645    Second trimester pregnancy Z34.92

 

 [facility]  [id number] [place] [address] 01861-
3972     

 

 [facility]  [id number] [place] [address] [phone]-
[phone]  30 Mar, 2018  Elevated serum glucose R73.9

 

 [facility]  [id number] [place] [address] [phone]-
0852  28 Mar, 2018  Unspecified mood [affective] disorder F39

 

 [facility]  [id number] [place] [address] 43873-
5866  26 Mar, 2018   

 

 [facility]  [address] [place] [address]  23 Mar, 2018  
Encounter for examination and observation for unspecified reason Z04.9

 

 [facility]  [address] [place] [address]  22 Mar, 2018  
Encounter for examination and observation for unspecified reason Z04.9

 

 [facility]  [id number] [place] [address] 49577-
9500  21 Mar, 2018  Unspecified mood [affective] disorder F39

 

 [facility]  [address] [address]-
3532  21 Mar, 2018  Elevated serum glucose R73.9

 

 [facility]  [address]100[place] [address]-
8501  14 Mar, 2018  Prenatal care in second trimester Z34.92

 

 [facility]  [address] [place] [address] 35205-
2360  09 Mar, 2018  Screening for substance abuse Z13.89

 

 [facility]  [address]100[place] [address]-
8885  14 2018  Second trimester pregnancy Z34.92

 

 [facility]  [address] [place] [address] 61537-
0306  14 2018   

 

 [facility]  [id number] [place] [address] 80203-
9120    Screening for deficiency anemia Z13.0

 

 [facility]  [address]100[place] [address]-
7120     

 

 [facility]  [id number] [place] [address] 76591-
6537     

 

 [facility]  [address] [address]-
6320    Normal pregnancy in multigravida Z34.80 and Moderate 
episode of recurrent major depressive disorder F33.1

 

 [facility]  [address]100[place] [address]-
1421     

 

 [facility]  [address] [address]-
0762    Posttraumatic stress disorder F43.10 ; ADHD (attention 
deficit hyperactivity disorder) evaluation Z13.89 and Moderate episode of 
recurrent major depressive disorder F33.1

 

 [facility]  [address] [address]-
6887  15 Ankit, 2017  Posttraumatic stress disorder F43.10

 

 [facility]  [address] [facility] [id number] [address]-
2726    Posttraumatic stress disorder F43.10 ; ADHD (attention 
deficit hyperactivity disorder) evaluation Z13.89 and Moderate episode of 
recurrent major depressive disorder F33.1

 

 [facility]  [address] [facility] [id number] [place] [address]-
5956  30 May, 2017  Moderate episode of recurrent major depressive disorder 
F33.1 and Attention deficit hyperactivity disorder (ADHD), combined type F90.2

 

 [facility]  [address] [facility] [id number] [place] [address]-
5566  08 May, 2017  Posttraumatic stress disorder F43.10

 

 [facility]  [address] [facility] [id number] [place] [address]-
2456  08 May, 2017   

 

 [facility]  [address] [facility] [id number] [place] [address]-
8656  08 May, 2017  Posttraumatic stress disorder F43.10 ; Moderate episode of 
recurrent major depressive disorder F33.1 and Attention deficit hyperactivity 
disorder (ADHD), combined type F90.2

 

 [facility]  [address] [facility] [id number] [place] [address]-
9706  08 May, 2017  Posttraumatic stress disorder F43.10 ; ADHD (attention 
deficit hyperactivity disorder) evaluation Z13.89 and Moderate episode of 
recurrent major depressive disorder F33.1

 

 [facility]  [address] [facility] [id number] [place] [address]-
4476  02 May, 2017  Moderate episode of recurrent major depressive disorder 
F33.1 and Posttraumatic stress disorder F43.10

 

 [facility]  [address] [facility] [id number] [place] [address]-
1015  28 Mar, 2017  Posttraumatic stress disorder F43.10 ; ADHD (attention 
deficit hyperactivity disorder) evaluation Z13.89 and Moderate episode of 
recurrent major depressive disorder F33.1

 

 [facility]  [address] [facility] [id number] [place] [address]
2546     

 

 [facility]  [address] [facility] [id number] [place] [address]-
[address]6     

 

 [facility] [place] [facility]  [address] [id number] [place] [address]-
4279     

 

 [facility] [place] [facility]  [address] [id number] [place] [address]-
7430     

 

 [facility] [place] [facility]  [address] [id number] [place] [address]-
7136     

 

 [facility] [place] [facility]  [address] [id number] [place] [address]-
8661     

 

 [facility] [place] [facility]  [address] [id number] [place] [address]-
7013  15 Jul, 2014   

 

 [facility] [place] [facility]  [address] [id number] [place] [address]-
3159     

 

 [facility] [place] [facility]  [address] [id number] [place] [address]-
5637  10 Jul, 2014   

 

 [facility] [place] [facility]  [address] [id number] [place] [address]-
0976  10 Jul, 2014   

 

 [facility] [place] [facility]  [address] [id number] [place] [address]-
9483     

 

 [facility] [place] [facility]  [address] [id number] [place] [address]-
1563     

 

 [facility] [place] [facility]  [address] [id number] [place] [address]-
2208  11 Sep, 2012   

 

 [facility] [place] [facility]  [address] [id number] [place] [address]-
7402  14 May, 2012   

 

 [facility] [place] [facility]  [address] [id number] [place] [address]-
5537     

 

 [facility] [place] [facility]  [address] [id number] [place] [address]-
3736  28 Mar, 2012   

 

 [facility] [place] [facility]  [address] [id number] [place] [address]-
4136  20 Mar, 2012   

 

 [facility] [place] [facility]  [address] [id number] [place] [address]-
9302  19 Mar, 2012   

 

 [facility]  [id number] [address]-
2315  16 Mar, 2012   

 

 [facility]  [address]65100[place] [address]-
3402     

 

 [facility]  [address]65100[place] [address]-
3247     

 

 [facility]  [address]65100[place] [address]-
1871     

 

 [facility]  [address]65100[place] [address]-
3532     

 

 [facility]  [address]65100[place] [address]-
9312     

 

 [facility]  [address]65100[place] [address]-
7868  28 Dec, 2011   

 

 [facility]  [address]100[place] [address]-
2764  15 Dec, 2009   

 

 [facility]  [address]65100[place] [address]-
5342  15 Dec, 2009   







IMMUNIZATIONS

No Known Immunizations



SOCIAL HISTORY

Never Assessed



REASON FOR VISIT

Requests return call



PLAN OF CARE





VITAL SIGNS





MEDICATIONS

Unknown Medications



RESULTS

No Results



PROCEDURES

No Known procedures



INSTRUCTIONS





MEDICATIONS ADMINISTERED

No Known Medications



MEDICAL (GENERAL) HISTORY







 Type  Description  Date

 

 Medical History  Depression   

 

 Medical History  Anxiety   

 

 Medical History  ADHD   

 

 Medical History  gestational diabetes   

 

 Surgical History  Gallbladder Removal   

 

 Surgical History  Appendectomy   

 

 Surgical History   x 2   

 

 Hospitalization History  Surgery/childbirth   

 

 Hospitalization History  Excessive vomiting  3/30/17

## 2018-06-27 NOTE — OPERATIVE REPORT
DATE OF SERVICE:  



PREOPERATIVE DIAGNOSES:

1.  A 37-year-old G3, P2 at 37 weeks gestation.

2.  Oligohydramnios.

3.  Advanced maternal age.

4.  Gestational diabetes mellitus A2.

5.  BMI of 37.



POSTOPERATIVE DIAGNOSES:

1.  A 37-year-old G3, P2 at 37 weeks gestation.

2.  Oligohydramnios.

3.  Advanced maternal age.

4.  Gestational diabetes mellitus A2.

5.  BMI of 37.

6.  Dense anterior abdominal wall adhesions and peritoneal adhesions.



SURGEON:

Dr. Edu Schultz.



ASSISTANT:

Dr. Galileo Davila.



ANESTHESIA:

Spinal.



ESTIMATED BLOOD LOSS:

600 mL.



URINE OUTPUT:

100 mL clear at the end of the procedure.



FLUIDS:

2000 mL lactated Ringer solution.



FINDINGS:

A live female infant weighing 8 pounds 4 ounces, Apgars of 9 and 9.  Grossly

normal appearing bilateral fallopian tubes and ovaries with several subserosal

fibroids and intramural fibroids that were encountered during my uterine

incision.  There were also dense adhesions of the anterior uterine fundus to the

anterior pelvic peritoneal wall.



SPECIMENS SENT:

Placenta.



INDICATIONS:

Please see my preoperative consultation and note for complete details pertaining

to indications for procedure.



DESCRIPTION OF PROCEDURE:

The patient was taken to the operating room where spinal anesthesia was found to

be adequate.  She was placed in the supine position with a leftward tilt,

prepped and draped in normal sterile fashion.  A timeout was performed. 

Anesthesia was tested.  A Pfannenstiel skin incision was then made through the

previously existing scar using a knife and carried down to the underlying fascia

using Bovie cautery.  Fascial incision extended laterally using Bovie cautery. 

Superior aspect of the fascial incision was then grasped with Kocher clamps,

tented up and dissected off the underlying rectus muscle.  The inferior aspect

of the fascial incision was then grasped with Kocher clamps, tented upward and

dissected off the underlying rectus muscles.  The rectus muscles were then

dissected down the midline using Gomez scissors.  The peritoneum is densely

adhesed to the anterior portion of the uterus, I have to take this down

carefully.  Care was taken to stay high in the peritoneum and dissect downward

staying close to the uterus and my dissection planes in order to avoid the

bladder.  Once I freed up all of the dense and filmy adhesions surrounding the

uterus, I then able to identify the lower uterine segment.  I make an incision

to the vesicouterine peritoneum of the lower uterine segment and bluntly

dissected this off the lower uterine segment.  I then proceeded with myotomy

until membranes were visualized, at which point I ruptured membranes using an

Allis clamp.  Clear fluid was noted.  The infant was found in the vertex

presentation.  With gentle fundal pressure, the infant's head was delivered

through the incision where the nares and oropharynx were then bulb suctioned. 

Anterior and posterior shoulders were delivered.  The infant was then brought to

the operative field where cord was doubly clamped and cut and infant was taken

off the operative field by Dr. Davila for further assessment.  Cord blood was

collected.  Three-vessel cord was intact.  Placenta was delivered spontaneously

thereafter.  IV Pitocin was initiated to facilitate uterine contraction. 

Uterine fundus became firmer with bimanual massage.  Uterus was exteriorized and

cleared of all endometrial clots and debris.  I then proceeded with closing the

uterine incision in three separate layers, the first layer was 0 Vicryl suture

in a running locked fashion, second layer of 0 Monocryl in imbricating fashion,

third layer of Monocryl in imbricating fashion due to the thickness of the lower

uterine segment.  I then have to place a figure-of-eight suture closer to the

uterine fundus where the previous adhesions were taken down due to significant

bleeding from this area of the uterus and the serosal surface; however, after I

do this, hemostasis was noted.  I placed the uterus back within the pelvis and

copiously irrigated the pelvis using normal saline and once again, no active

bleeding noted from any of my dissection planes.  I then placed Surgicel down

the midline of the uterus over this serosal scar tissue to prevent further

bleeding.  I placed Interceed over the low transverse incision to reduce risk of

recurrent adhesions.  I then proceeded with closing the peritoneum using 3-0

Vicryl suture in a running fashion.  The rectus muscle reapproximated using 3-0

Vicryl suture in interrupted fashion.  The fascia reapproximated using 0 Vicryl

suture in running fashion.  Subcutaneous tissues reapproximated using 3-0 plain

in interrupted subcutaneous stitch and skin reapproximated using 4-0 Monocryl

running subcuticular.  Dermabond was applied to incision.  Sterile dressings

with adhesive white tape.  The patient tolerated the procedure well and sent to

recovery area in stable condition.  Lap and sponge counts were correct at the

end of the procedure.  Instrument counts correct as well.  Two grams of Ancef

were given preoperatively for infection prophylaxis.





Job ID: [id number]

DocumentID: [id number]

Dictated Date:  06/27/2018 08:45:48

Transcription Date: 06/27/2018 13:43:49

Dictated By: DO ZAIN VASQUEZ

## 2018-06-28 VITALS — DIASTOLIC BLOOD PRESSURE: 75 MMHG | SYSTOLIC BLOOD PRESSURE: 110 MMHG

## 2018-06-28 VITALS — DIASTOLIC BLOOD PRESSURE: 71 MMHG | SYSTOLIC BLOOD PRESSURE: 111 MMHG

## 2018-06-28 VITALS — DIASTOLIC BLOOD PRESSURE: 83 MMHG | SYSTOLIC BLOOD PRESSURE: 134 MMHG

## 2018-06-28 VITALS — SYSTOLIC BLOOD PRESSURE: 102 MMHG | DIASTOLIC BLOOD PRESSURE: 67 MMHG

## 2018-06-28 VITALS — DIASTOLIC BLOOD PRESSURE: 83 MMHG | SYSTOLIC BLOOD PRESSURE: 127 MMHG

## 2018-06-28 LAB
BASOPHILS # BLD AUTO: 0 10^3/UL (ref 0–0.1)
BASOPHILS NFR BLD AUTO: 0 % (ref 0–10)
EOSINOPHIL # BLD AUTO: 0.1 10^3/UL (ref 0–0.3)
EOSINOPHIL NFR BLD AUTO: 0 % (ref 0–10)
ERYTHROCYTE [DISTWIDTH] IN BLOOD BY AUTOMATED COUNT: 14.1 % (ref 10–14.5)
HCT VFR BLD CALC: 32 % (ref 35–52)
HGB BLD-MCNC: 10.9 G/DL (ref 11.5–16)
LYMPHOCYTES # BLD AUTO: 1.4 X 10^3 (ref 1–4)
LYMPHOCYTES NFR BLD AUTO: 10 % (ref 12–44)
MANUAL DIFFERENTIAL PERFORMED BLD QL: NO
MCH RBC QN AUTO: 29 PG (ref 25–34)
MCHC RBC AUTO-ENTMCNC: 34 G/DL (ref 32–36)
MCV RBC AUTO: 83 FL (ref 80–99)
MONOCYTES # BLD AUTO: 1 X 10^3 (ref 0–1)
MONOCYTES NFR BLD AUTO: 7 % (ref 0–12)
NEUTROPHILS # BLD AUTO: 11.9 X 10^3 (ref 1.8–7.8)
NEUTROPHILS NFR BLD AUTO: 83 % (ref 42–75)
PLATELET # BLD: 214 10^3/UL (ref 130–400)
PMV BLD AUTO: 11.4 FL (ref 7.4–10.4)
RBC # BLD AUTO: 3.81 10^6/UL (ref 4.35–5.85)
WBC # BLD AUTO: 14.4 10^3/UL (ref 4.3–11)

## 2018-06-28 RX ADMIN — HYDROCODONE BITARTRATE AND ACETAMINOPHEN PRN TAB: 5; 325 TABLET ORAL at 12:31

## 2018-06-28 RX ADMIN — DOCUSATE SODIUM SCH MG: 100 CAPSULE ORAL at 21:20

## 2018-06-28 RX ADMIN — Medication SCH ML: at 13:53

## 2018-06-28 RX ADMIN — IBUPROFEN SCH MG: 600 TABLET ORAL at 21:20

## 2018-06-28 RX ADMIN — KETOROLAC TROMETHAMINE SCH MG: 30 INJECTION, SOLUTION INTRAMUSCULAR; INTRAVENOUS at 03:31

## 2018-06-28 RX ADMIN — Medication SCH ML: at 06:19

## 2018-06-28 RX ADMIN — HYDROCODONE BITARTRATE AND ACETAMINOPHEN PRN TAB: 5; 325 TABLET ORAL at 17:50

## 2018-06-28 RX ADMIN — IBUPROFEN SCH MG: 600 TABLET ORAL at 08:01

## 2018-06-28 RX ADMIN — IBUPROFEN SCH MG: 600 TABLET ORAL at 13:44

## 2018-06-28 RX ADMIN — DOCUSATE SODIUM SCH MG: 100 CAPSULE ORAL at 08:01

## 2018-06-28 NOTE — ANESTHESIA-REGIONAL POST-OP
Regional


Patient Condition


Mental Status:  Alert, Oriented x3


Circulation:  Same as Pre-Op


Headache:  Absent


Sensation:  Full Recovery


Motor Block:  Absent





Post Op Complications


Complications


None





Follow Up Care/Instructions


Patient Instructions


None needed.





Anesthesia/Patient Condition


Patient is doing well, no complaints, stable vital signs, no apparent adverse 

anesthesia problems.   


No complications reported per nursing.











RAJEEV CH CRNA Jun 28, 2018 08:05

## 2018-06-28 NOTE — POSTPARTUM PROGRESS NOTE
Postpartum Note


Postpartum Note


Postpartum Day # 1





Subjective:


Patient is without complaints. Ambulating, voiding. Tolerating a regular diet 

without nausea or vomiting. Normal lochia. Pain is well controlled with oral 

pain medications. 





Objective:





Vital Sign - Last 24 Hours








 6/27/18 6/27/18 6/27/18 6/27/18





 11:22 14:05 17:55 21:36


 


Temp  97.7 96.5 98.0


 


Pulse  92 91 95


 


Resp  16 18 18


 


B/P (MAP)  102/71 (81) 117/70 (86) 121/83 (96)


 


Pulse Ox  97 100 98


 


O2 Delivery Room Air Room Air Room Air Room Air


 


    





 6/28/18 6/28/18 6/28/18 





 00:00 04:00 08:00 


 


Temp 98.0 98.0 98.4 


 


Pulse 95 97 106 


 


Resp 18 18 18 


 


B/P (MAP) 111/71 (84) 134/83 (100) 127/83 (98) 


 


Pulse Ox 98 98 98 


 


O2 Delivery Room Air Room Air Room Air 














Intake and Output   


 


 6/27/18 6/27/18 6/28/18





 15:00 23:00 07:00


 


Intake Total 1050 ml 360 ml 840 ml


 


Output Total 700 ml 700 ml 1250 ml


 


Balance 350 ml -340 ml -410 ml














Physical Exam:


General - Alert and oriented, no apparent distress


Abdomen - Soft, appropriately tender to palpation, non-distended, fundus firm 

at umbilicus


Extremities - no edema, negative Blanca's bilaterally 


Incision- c/d/i








Assessment:


POD 1 RLTCS


Acute blood loss anemia








Plan:


Routine postpartum care.


Encourage breast feeding.


Encourage ambulation.


Ferrous sulfate supplementation.


Plan for discharge tomorrow





Vitals - Labs


Vital Signs - I&O





Vital Signs








  Date Time  Temp Pulse Resp B/P (MAP) Pulse Ox O2 Delivery O2 Flow Rate FiO2


 


6/28/18 08:00 98.4 106 18 127/83 (98) 98 Room Air  


 


6/28/18 04:00 98.0 97 18 134/83 (100) 98 Room Air  


 


6/28/18 00:00 98.0 95 18 111/71 (84) 98 Room Air  


 


6/27/18 21:36 98.0 95 18 121/83 (96) 98 Room Air  


 


6/27/18 17:55 96.5 91 18 117/70 (86) 100 Room Air  


 


6/27/18 14:05 97.7 92 16 102/71 (81) 97 Room Air  


 


6/27/18 11:22      Room Air  














I & O 


 


 6/28/18





 07:00


 


Intake Total 2250 ml


 


Output Total 2650 ml


 


Balance -400 ml











Labs


Laboratory Tests


6/28/18 05:22: 


White Blood Count 14.4H, Red Blood Count 3.81L, Hemoglobin 10.9L, Hematocrit 32L

, Mean Corpuscular Volume 83, Mean Corpuscular Hemoglobin 29, Mean Corpuscular 

Hemoglobin Concent 34, Red Cell Distribution Width 14.1, Platelet Count 214, 

Mean Platelet Volume 11.4H, Neutrophils (%) (Auto) 83H, Lymphocytes (%) (Auto) 

10L, Monocytes (%) (Auto) 7, Eosinophils (%) (Auto) 0, Basophils (%) (Auto) 0, 

Neutrophils # (Auto) 11.9H, Lymphocytes # (Auto) 1.4, Monocytes # (Auto) 1.0, 

Eosinophils # (Auto) 0.1, Basophils # (Auto) 0.0











MIRTHA ROMERO DO Jun 28, 2018 10:55 am

## 2018-06-29 VITALS — SYSTOLIC BLOOD PRESSURE: 117 MMHG | DIASTOLIC BLOOD PRESSURE: 87 MMHG

## 2018-06-29 VITALS — SYSTOLIC BLOOD PRESSURE: 93 MMHG | DIASTOLIC BLOOD PRESSURE: 72 MMHG

## 2018-06-29 VITALS — DIASTOLIC BLOOD PRESSURE: 72 MMHG | SYSTOLIC BLOOD PRESSURE: 104 MMHG

## 2018-06-29 VITALS — DIASTOLIC BLOOD PRESSURE: 72 MMHG | SYSTOLIC BLOOD PRESSURE: 93 MMHG

## 2018-06-29 VITALS — SYSTOLIC BLOOD PRESSURE: 105 MMHG | DIASTOLIC BLOOD PRESSURE: 70 MMHG

## 2018-06-29 RX ADMIN — IBUPROFEN SCH MG: 600 TABLET ORAL at 09:18

## 2018-06-29 RX ADMIN — HYDROCODONE BITARTRATE AND ACETAMINOPHEN PRN TAB: 5; 325 TABLET ORAL at 06:23

## 2018-06-29 RX ADMIN — IBUPROFEN SCH MG: 600 TABLET ORAL at 01:42

## 2018-06-29 RX ADMIN — DOCUSATE SODIUM SCH MG: 100 CAPSULE ORAL at 09:18

## 2018-06-29 RX ADMIN — HYDROCODONE BITARTRATE AND ACETAMINOPHEN PRN TAB: 5; 325 TABLET ORAL at 12:04

## 2018-06-29 NOTE — POSTPARTUM PROGRESS NOTE
Postpartum Note


Postpartum Note


Postpartum Day # 2





Subjective:


Patient is without complaints. Ambulating, voiding. Tolerating a regular diet 

without nausea or vomiting. Normal lochia. Pain is well controlled with oral 

pain medications. 





Objective:





Vital Sign - Last 24 Hours








 6/28/18 6/28/18 6/29/18 6/29/18





 12:00 17:08 00:10 05:00


 


Temp 98.2 97.8 97.1 97.8


 


Pulse 86 91 96 81


 


Resp 18 18 18 18


 


B/P (MAP) 110/75 (87) 102/67 (79) 104/72 (83) 117/87 (97)


 


Pulse Ox 98 98 97 98


 


O2 Delivery Room Air Room Air Room Air Room Air


 


    





 6/29/18   





 08:00   


 


Temp 97.8   


 


Pulse 90   


 


Resp 20   


 


B/P (MAP) 105/70 (82)   


 


Pulse Ox 96   


 


O2 Delivery Room Air   














Intake and Output   


 


 6/28/18 6/28/18 6/29/18





 15:00 23:00 07:00


 


Intake Total  960 ml 1000 ml


 


Output Total  900 ml 1300 ml


 


Balance  60 ml -300 ml











Physical Exam:


General - Alert and oriented, no apparent distress


Abdomen - Soft, appropriately tender to palpation, non-distended, fundus firm 

at umbilicus


Extremities - no edema, negative Blnaca's bilaterally 


Incision- c/d/i





Assessment:


POD 2 RLTCS


Acute blood loss anemia











Plan:


Routine postpartum care.


Encourage breast feeding.


Encourage ambulation.


Ferrous sulfate supplementation.


Plan for discharge today





Vitals - Labs


Vital Signs - I&O





Vital Signs








  Date Time  Temp Pulse Resp B/P (MAP) Pulse Ox O2 Delivery O2 Flow Rate FiO2


 


6/29/18 08:00 97.8 90 20 105/70 (82) 96 Room Air  


 


6/29/18 05:00 97.8 81 18 117/87 (97) 98 Room Air  


 


6/29/18 00:10 97.1 96 18 104/72 (83) 97 Room Air  


 


6/28/18 17:08 97.8 91 18 102/67 (79) 98 Room Air  


 


6/28/18 12:00 98.2 86 18 110/75 (87) 98 Room Air  














I & O 


 


 6/29/18





 07:00


 


Intake Total 1960 ml


 


Output Total 2200 ml


 


Balance -240 ml

















MIRTHA ROMERO DO Jun 29, 2018 8:30 am

## 2018-10-25 ENCOUNTER — HOSPITAL ENCOUNTER (EMERGENCY)
Dept: HOSPITAL 75 - ER | Age: 38
Discharge: HOME | End: 2018-10-25
Payer: SELF-PAY

## 2018-10-25 VITALS — HEIGHT: 66 IN | WEIGHT: 228 LBS | BODY MASS INDEX: 36.64 KG/M2

## 2018-10-25 VITALS — DIASTOLIC BLOOD PRESSURE: 104 MMHG | SYSTOLIC BLOOD PRESSURE: 141 MMHG

## 2018-10-25 DIAGNOSIS — Z98.890: ICD-10-CM

## 2018-10-25 DIAGNOSIS — F41.9: ICD-10-CM

## 2018-10-25 DIAGNOSIS — K21.9: ICD-10-CM

## 2018-10-25 DIAGNOSIS — F32.9: ICD-10-CM

## 2018-10-25 DIAGNOSIS — Z79.84: ICD-10-CM

## 2018-10-25 DIAGNOSIS — Z90.89: ICD-10-CM

## 2018-10-25 DIAGNOSIS — Z87.891: ICD-10-CM

## 2018-10-25 DIAGNOSIS — R59.0: Primary | ICD-10-CM

## 2018-10-25 LAB
ALBUMIN SERPL-MCNC: 4.2 GM/DL (ref 3.2–4.5)
ALP SERPL-CCNC: 64 U/L (ref 40–136)
ALT SERPL-CCNC: 36 U/L (ref 0–55)
BASOPHILS # BLD AUTO: 0 10^3/UL (ref 0–0.1)
BASOPHILS NFR BLD AUTO: 0 % (ref 0–10)
BILIRUB SERPL-MCNC: 0.3 MG/DL (ref 0.1–1)
BUN/CREAT SERPL: 16
CALCIUM SERPL-MCNC: 9.2 MG/DL (ref 8.5–10.1)
CHLORIDE SERPL-SCNC: 100 MMOL/L (ref 98–107)
CO2 SERPL-SCNC: 20 MMOL/L (ref 21–32)
CREAT SERPL-MCNC: 0.85 MG/DL (ref 0.6–1.3)
EOSINOPHIL # BLD AUTO: 0.2 10^3/UL (ref 0–0.3)
EOSINOPHIL NFR BLD AUTO: 2 % (ref 0–10)
ERYTHROCYTE [DISTWIDTH] IN BLOOD BY AUTOMATED COUNT: 13 % (ref 10–14.5)
GFR SERPLBLD BASED ON 1.73 SQ M-ARVRAT: > 60 ML/MIN
GLUCOSE SERPL-MCNC: 257 MG/DL (ref 70–105)
HCT VFR BLD CALC: 39 % (ref 35–52)
HGB BLD-MCNC: 12.9 G/DL (ref 11.5–16)
LYMPHOCYTES # BLD AUTO: 3.1 X 10^3 (ref 1–4)
LYMPHOCYTES NFR BLD AUTO: 28 % (ref 12–44)
MANUAL DIFFERENTIAL PERFORMED BLD QL: NO
MCH RBC QN AUTO: 28 PG (ref 25–34)
MCHC RBC AUTO-ENTMCNC: 33 G/DL (ref 32–36)
MCV RBC AUTO: 85 FL (ref 80–99)
MONOCYTES # BLD AUTO: 0.8 X 10^3 (ref 0–1)
MONOCYTES NFR BLD AUTO: 7 % (ref 0–12)
NEUTROPHILS # BLD AUTO: 7.2 X 10^3 (ref 1.8–7.8)
NEUTROPHILS NFR BLD AUTO: 64 % (ref 42–75)
PLATELET # BLD: 297 10^3/UL (ref 130–400)
PMV BLD AUTO: 10.7 FL (ref 7.4–10.4)
POTASSIUM SERPL-SCNC: 4.2 MMOL/L (ref 3.6–5)
PROT SERPL-MCNC: 7 GM/DL (ref 6.4–8.2)
RBC # BLD AUTO: 4.56 10^6/UL (ref 4.35–5.85)
SODIUM SERPL-SCNC: 133 MMOL/L (ref 135–145)
WBC # BLD AUTO: 11.3 10^3/UL (ref 4.3–11)

## 2018-10-25 PROCEDURE — 74177 CT ABD & PELVIS W/CONTRAST: CPT

## 2018-10-25 PROCEDURE — 80053 COMPREHEN METABOLIC PANEL: CPT

## 2018-10-25 PROCEDURE — 96374 THER/PROPH/DIAG INJ IV PUSH: CPT

## 2018-10-25 PROCEDURE — 36415 COLL VENOUS BLD VENIPUNCTURE: CPT

## 2018-10-25 PROCEDURE — 85025 COMPLETE CBC W/AUTO DIFF WBC: CPT

## 2018-10-25 NOTE — ED ABDOMINAL PAIN
General


Chief Complaint:  Abdominal/GI Problems


Stated Complaint:  LUMP ON R SIDE


Source of Information:  Patient


Exam Limitations:  No Limitations





History of Present Illness


Date Seen by Provider:  Oct 25, 2018


Time Seen by Provider:  21:49


Initial Comments


To ER with right sided lateral upper abdominal nodule very tender to palpation 

over the past few days. She's had this nodule present for about a month but 

only recently become painful. She's been gurgling on the Internet and believes 

that her liver is enlarged she is tearful on arrival to ER, very anxious. She 

recently delivered via  4 months ago. Denies any other lymph node 

swelling. No fevers. She has been generally fatigued. Denies any cat scratches 

that she is aware of,


Timing/Duration:  Intermittent


Severity/Quality:  Moderate


Location:  Generalized Abdomen


Radiation:  No Radiation


Activities at Onset:  None





Allergies and Home Medications


Allergies


Coded Allergies:  


     No Known Drug Allergies (Unverified , 18)





Home Medications


Docusate Sodium 100 Mg Capsule, 100 MG PO BID PRN for CONSTIPATION-1ST LINE


   Prescribed by: MIRTHA ROMERO on 18 174


Hydrocodone Bit/Acetaminophen 1 Tab Tab, 1-2 TAB PO Q4H PRN for PAIN-MODERATE


   Prescribed by: MIRTHA ROMERO on 18 174


Ibuprofen 600 Mg Tablet, 600 MG PO Q6H


   Prescribed by: MIRTHA ROMERO on 18 174


Metformin HCl 500 Mg Tablet, 1,000 PO BID, (Reported)


   take 2 in am and 1 at night 


Uio019/Iron Fumarate/FA/Dss 1 Each Tablet, 1 EACH PO DAILY, (Reported)





Patient Home Medication List


Home Medication List Reviewed:  Yes





Review of Systems


Review of Systems


Constitutional:  see HPI


EENTM:  No Symptoms Reported


Respiratory:  No Symptoms Reported


Cardiovascular:  No Symptoms Reported


Gastrointestinal:  See HPI, Abdominal Pain


Musculoskeletal:  no symptoms reported


Skin:  no symptoms reported


Psychiatric/Neurological:  No Symptoms Reported





Past Medical-Social-Family Hx


Patient Social History


Alcohol Use:  Denies Use


Recreational Drug Use:  No


Smoking Status:  Former Smoker


Type Used:  Cigarettes


Former Smoker, Quit:  2000


Recent Foreign Travel:  No


Contact w/Someone Who Travel:  No


Recent Hopitalizations:  No


Physical Abuse:  No


Sexual Abuse:  No


Mistreated:  No


Fear:  No





Immunizations Up To Date


Tetanus Booster (TDap):  Unknown





Seasonal Allergies


Seasonal Allergies:  No





Past Medical History


Surgeries:  Yes (LIPOMA FROM ABDX2,  2 )


Appendectomy,  Section


Respiratory:  No


Cardiac:  No


Neurological:  No


Reproductive Disorders:  No


Female Reproductive Disorders:  Menstrual Problems


Sexually Transmitted Disease:  No


HIV/AIDS:  No


Genitourinary:  No


Gastrointestinal:  Yes


Gastroesophageal Reflux


Musculoskeletal:  No


Endocrine:  Yes (GESTATIONAL DIABETES)


HEENT:  No


Loss of Vision:  Denies


Hearing Impairment:  Denies


Cancer:  No


Psychosocial:  Yes (HX OF ANXIETY)


Anxiety, Depression


Integumentary:  No


Blood Disorders:  No


Adverse Reaction/Blood Tranf:  No (N/A)





Physical Exam


Vital Signs





Vital Signs - First Documented








 10/25/18





 21:44


 


Temp 97.6


 


Pulse 91


 


Resp 20


 


B/P (MAP) 141/104 (116)


 


Pulse Ox 99





Capillary Refill :


Height/Weight/BMI


Height: 5'6.50"


Weight: 238lbs. 0.0oz. [id number]; 37.8 BMI


Method:Stated


General Appearance:  WD/WN, no apparent distress


HEENT:  PERRL/EOMI, normal ENT inspection


Respiratory:  no respiratory distress, no accessory muscle use


Cardiovascular:  regular rate, rhythm, no murmur


Gastrointestinal:  normal bowel sounds, soft, tenderness (right lower abdominal 

tenderness to palpation. ), other (tender mobile 1 cm nodule consistent with a 

lymph node to the lateral aspects of the chest wall on the right overlying 

about the 10th rib. no overlying erythema or ecchymosis. )


Extremities:  normal range of motion, non-tender


Neurologic/Psychiatric:  alert, normal mood/affect, oriented x 3


Skin:  normal color, warm/dry





Progress/Results/Core Measures


Results/Orders


Lab Results





Laboratory Tests








Test


 10/25/18


21:47 Range/Units


 


 


White Blood Count


 11.3 H


 4.3-11.0


10^3/uL


 


Red Blood Count


 4.56 


 4.35-5.85


10^6/uL


 


Hemoglobin 12.9  11.5-16.0  G/DL


 


Hematocrit 39  35-52  %


 


Mean Corpuscular Volume 85  80-99  FL


 


Mean Corpuscular Hemoglobin 28  25-34  PG


 


Mean Corpuscular Hemoglobin


Concent 33 


 32-36  G/DL





 


Red Cell Distribution Width 13.0  10.0-14.5  %


 


Platelet Count


 297 


 130-400


10^3/uL


 


Mean Platelet Volume 10.7 H 7.4-10.4  FL


 


Neutrophils (%) (Auto) 64  42-75  %


 


Lymphocytes (%) (Auto) 28  12-44  %


 


Monocytes (%) (Auto) 7  0-12  %


 


Eosinophils (%) (Auto) 2  0-10  %


 


Basophils (%) (Auto) 0  0-10  %


 


Neutrophils # (Auto) 7.2  1.8-7.8  X 10^3


 


Lymphocytes # (Auto) 3.1  1.0-4.0  X 10^3


 


Monocytes # (Auto) 0.8  0.0-1.0  X 10^3


 


Eosinophils # (Auto)


 0.2 


 0.0-0.3


10^3/uL


 


Basophils # (Auto)


 0.0 


 0.0-0.1


10^3/uL


 


Sodium Level 133 L 135-145  MMOL/L


 


Potassium Level 4.2  3.6-5.0  MMOL/L


 


Chloride Level 100    MMOL/L


 


Carbon Dioxide Level 20 L 21-32  MMOL/L


 


Anion Gap 13  5-14  MMOL/L


 


Blood Urea Nitrogen 14  7-18  MG/DL


 


Creatinine


 0.85 


 0.60-1.30


MG/DL


 


Estimat Glomerular Filtration


Rate > 60 


  





 


BUN/Creatinine Ratio 16   


 


Glucose Level 257 H   MG/DL


 


Calcium Level 9.2  8.5-10.1  MG/DL


 


Corrected Calcium 9.0  8.5-10.1  MG/DL


 


Total Bilirubin 0.3  0.1-1.0  MG/DL


 


Aspartate Amino Transf


(AST/SGOT) 25 


 5-34  U/L





 


Alanine Aminotransferase


(ALT/SGPT) 36 


 0-55  U/L





 


Alkaline Phosphatase 64    U/L


 


Total Protein 7.0  6.4-8.2  GM/DL


 


Albumin 4.2  3.2-4.5  GM/DL








My Orders





Orders - ARMIDA TONEY


Cbc With Automated Diff (10/25/18 21:45)


Comprehensive Metabolic Panel (10/25/18 21:45)


Iv Heplock-Insert (Order) (10/25/18 21:45)


Ct Abdomen/Pelvis W (10/25/18 21:45)


Ketorolac Injection (Toradol Injection) (10/25/18 22:00)


Iohexol Injection (Omnipaque 350 Mg/Ml 1 (10/25/18 22:45)


Ns (Ivpb) (Sodium Chloride 0.9%) (10/25/18 22:45)


Pharmacy Communication (Pharmacy Communi (10/25/18 22:41)





Medications Given in ED





Current Medications








 Medications  Dose


 Ordered  Sig/Ashley


 Route  Start Time


 Stop Time Status Last Admin


Dose Admin


 


 Iohexol  100 ml  ONCE  ONCE


 IV  10/25/18 22:45


 10/25/18 22:46  10/25/18 22:43


100 ML


 


 Ketorolac


 Tromethamine  30 mg  ONCE  ONCE


 IVP  10/25/18 22:00


 10/25/18 22:01 DC 10/25/18 22:00


30 MG


 


 Sodium Chloride  250 ml  ONCE  ONCE


 IV  10/25/18 22:45


 10/25/18 22:46  10/25/18 22:43


80 ML








Vital Signs/I&O











 10/25/18





 21:44


 


Temp 97.6


 


Pulse 91


 


Resp 20


 


B/P (MAP) 141/104 (116)


 


Pulse Ox 99











Departure


Impression





 Primary Impression:  


 Lymphadenopathy


 Additional Impression:  


 Anxiety


Disposition:  01 HOME, SELF-CARE


Condition:  Stable





Departure-Patient Inst.


Decision time for Depature:  21:52


Referrals:  


ARRON ALATORRE MD (PCP/Family)


Primary Care Physician


Patient Instructions:  LYMPH NODE SWELLING





Add. Discharge Instructions:  


1. Return to ER for any concerns





Images


Torso/Trunk











1 - 








Copy


Copies To 1:   ARRON ALATORRE MD, PETER J APRN Oct 25, 2018 21:53

## 2018-10-26 NOTE — DIAGNOSTIC IMAGING REPORT
PROCEDURE: CT abdomen and pelvis with contrast.



TECHNIQUE: Multiple contiguous axial images were obtained through

the abdomen and pelvis after administration of intravenous

contrast. 



INDICATION:  Palpable lump in the right upper abdomen/rib cage

for one month.



COMPARISON: 08/10/2014



FINDINGS:



The lung bases are clear. The heart is normal in size. There is

no pericardial effusion.



There is hepatic steatosis with hepatomegaly. No focal liver

lesions are seen. The spleen is unremarkable. The pancreas

appears normal. A small splenule is seen. The bilateral adrenal

glands appear normal. The right kidney demonstrates a small

hyperdensity which may represent early excretion of contrast.

There is no hydronephrosis or obstructing renal calculi.



The bowel loops are nondistended without evidence of obstruction.

The appendix is not seen. No free fluid or free air is seen.

There are mild degenerative changes in the lumbar spine. There is

a focal area of mild fat stranding in the subcutaneous fat of the

right anterior abdomen which appears to represent scarring. No

discrete mass or fluid collection is identified. No herniation

through the anterior abdominal wall is seen. Overall this appears

decreased when compared to 2014.



IMPRESSION:

1. Focal area of mild fat stranding, possibly scarring in the

subcutaneous right anterior abdomen. No masses or fluid

collections are seen.

2. Hepatomegaly with hepatic steatosis.



Dictated by: 



  Dictated on workstation # [id number]

## 2020-04-02 ENCOUNTER — HOSPITAL ENCOUNTER (EMERGENCY)
Dept: HOSPITAL 75 - ER | Age: 40
LOS: 1 days | Discharge: HOME | End: 2020-04-03
Payer: MEDICAID

## 2020-04-02 VITALS — WEIGHT: 209.88 LBS | HEIGHT: 65 IN | BODY MASS INDEX: 34.97 KG/M2

## 2020-04-02 DIAGNOSIS — F41.9: ICD-10-CM

## 2020-04-02 DIAGNOSIS — F32.9: ICD-10-CM

## 2020-04-02 DIAGNOSIS — M94.0: Primary | ICD-10-CM

## 2020-04-02 DIAGNOSIS — J02.9: ICD-10-CM

## 2020-04-02 DIAGNOSIS — K21.9: ICD-10-CM

## 2020-04-02 DIAGNOSIS — Z87.891: ICD-10-CM

## 2020-04-02 PROCEDURE — 80306 DRUG TEST PRSMV INSTRMNT: CPT

## 2020-04-02 PROCEDURE — 83874 ASSAY OF MYOGLOBIN: CPT

## 2020-04-02 PROCEDURE — 85027 COMPLETE CBC AUTOMATED: CPT

## 2020-04-02 PROCEDURE — 85379 FIBRIN DEGRADATION QUANT: CPT

## 2020-04-02 PROCEDURE — 83735 ASSAY OF MAGNESIUM: CPT

## 2020-04-02 PROCEDURE — 85730 THROMBOPLASTIN TIME PARTIAL: CPT

## 2020-04-02 PROCEDURE — 85007 BL SMEAR W/DIFF WBC COUNT: CPT

## 2020-04-02 PROCEDURE — 83690 ASSAY OF LIPASE: CPT

## 2020-04-02 PROCEDURE — 87430 STREP A AG IA: CPT

## 2020-04-02 PROCEDURE — 36415 COLL VENOUS BLD VENIPUNCTURE: CPT

## 2020-04-02 PROCEDURE — 93005 ELECTROCARDIOGRAM TRACING: CPT

## 2020-04-02 PROCEDURE — 80053 COMPREHEN METABOLIC PANEL: CPT

## 2020-04-02 PROCEDURE — 84703 CHORIONIC GONADOTROPIN ASSAY: CPT

## 2020-04-02 PROCEDURE — 85610 PROTHROMBIN TIME: CPT

## 2020-04-02 PROCEDURE — 71045 X-RAY EXAM CHEST 1 VIEW: CPT

## 2020-04-02 PROCEDURE — 84484 ASSAY OF TROPONIN QUANT: CPT

## 2020-04-03 VITALS — DIASTOLIC BLOOD PRESSURE: 80 MMHG | SYSTOLIC BLOOD PRESSURE: 111 MMHG

## 2020-04-03 LAB
ALBUMIN SERPL-MCNC: 4.3 GM/DL (ref 3.2–4.5)
ALP SERPL-CCNC: 47 U/L (ref 40–136)
ALT SERPL-CCNC: 18 U/L (ref 0–55)
APTT BLD: 36 SEC (ref 24–35)
BARBITURATES UR QL: NEGATIVE
BASOPHILS # BLD AUTO: 0 10^3/UL (ref 0–0.1)
BASOPHILS NFR BLD AUTO: 0 % (ref 0–10)
BENZODIAZ UR QL SCN: NEGATIVE
BILIRUB SERPL-MCNC: 0.4 MG/DL (ref 0.1–1)
BUN/CREAT SERPL: 16
CALCIUM SERPL-MCNC: 9.2 MG/DL (ref 8.5–10.1)
CHLORIDE SERPL-SCNC: 105 MMOL/L (ref 98–107)
CO2 SERPL-SCNC: 19 MMOL/L (ref 21–32)
COCAINE UR QL: NEGATIVE
CREAT SERPL-MCNC: 0.86 MG/DL (ref 0.6–1.3)
D DIMER PPP FEU-MCNC: 0.96 UG/ML (ref 0–0.49)
EOSINOPHIL # BLD AUTO: 0.1 10^3/UL (ref 0–0.3)
EOSINOPHIL NFR BLD AUTO: 1 % (ref 0–10)
EOSINOPHIL NFR BLD MANUAL: 1 %
ERYTHROCYTE [DISTWIDTH] IN BLOOD BY AUTOMATED COUNT: 12.8 % (ref 10–14.5)
GFR SERPLBLD BASED ON 1.73 SQ M-ARVRAT: > 60 ML/MIN
GLUCOSE SERPL-MCNC: 105 MG/DL (ref 70–105)
HCT VFR BLD CALC: 39 % (ref 35–52)
HGB BLD-MCNC: 13.1 G/DL (ref 11.5–16)
INR PPP: 1 (ref 0.8–1.4)
LIPASE SERPL-CCNC: 47 U/L (ref 8–78)
LYMPHOCYTES # BLD AUTO: 4.9 X 10^3 (ref 1–4)
LYMPHOCYTES NFR BLD AUTO: 32 % (ref 12–44)
MAGNESIUM SERPL-MCNC: 1.9 MG/DL (ref 1.6–2.4)
MANUAL DIFFERENTIAL PERFORMED BLD QL: YES
MCH RBC QN AUTO: 29 PG (ref 25–34)
MCHC RBC AUTO-ENTMCNC: 33 G/DL (ref 32–36)
MCV RBC AUTO: 85 FL (ref 80–99)
METHADONE UR QL SCN: NEGATIVE
METHAMPHETAMINE SCREEN URINE S: NEGATIVE
MONOCYTES # BLD AUTO: 1.1 X 10^3 (ref 0–1)
MONOCYTES NFR BLD AUTO: 7 % (ref 0–12)
MONOCYTES NFR BLD: 5 %
NEUTROPHILS # BLD AUTO: 9.2 X 10^3 (ref 1.8–7.8)
NEUTROPHILS NFR BLD AUTO: 60 % (ref 42–75)
NEUTS BAND NFR BLD MANUAL: 62 %
OPIATES UR QL SCN: NEGATIVE
OXYCODONE UR QL: NEGATIVE
PLATELET # BLD: 357 10^3/UL (ref 130–400)
PMV BLD AUTO: 11 FL (ref 7.4–10.4)
POTASSIUM SERPL-SCNC: 4.2 MMOL/L (ref 3.6–5)
PROPOXYPH UR QL: NEGATIVE
PROT SERPL-MCNC: 7.7 GM/DL (ref 6.4–8.2)
PROTHROMBIN TIME: 13.5 SEC (ref 12.2–14.7)
RBC MORPH BLD: NORMAL
SODIUM SERPL-SCNC: 138 MMOL/L (ref 135–145)
TRICYCLICS UR QL SCN: NEGATIVE
VARIANT LYMPHS NFR BLD MANUAL: 32 %
WBC # BLD AUTO: 15.3 10^3/UL (ref 4.3–11)

## 2020-04-03 NOTE — DIAGNOSTIC IMAGING REPORT
INDICATION: Chest pain



COMPARISON: 04/04/2009



FINDINGS: Single frontal view of the chest demonstrates normal

heart size and pulmonary vascularity. The lungs are well aerated

and clear. No large pleural effusion or pneumothorax is seen. The

visualized osseous structures show no acute abnormalities.



IMPRESSION: 

1. No acute cardiopulmonary process.  



Dictated by: 



  Dictated on workstation # [id number]

## 2020-04-03 NOTE — ED CHEST PAIN
General


Chief Complaint:  Chest Pain


Stated Complaint:  SEVERE CP


Source:  patient


Exam Limitations:  no limitations





History of Present Illness


Date Seen by Provider:  Apr 3, 2020


Time Seen by Provider:  00:01


Initial Comments


The patient present ER by private conveyance from home with chief complaint that

over the past 24 hours is been having some grossly worsening chest 

pain/pressure/discomfort from her anterior chest radiating to her back. She says

it is worse when there is direct palpation to her chest or when she lies down 

flat. She denies a history of acid reflux. She has not taken anything for it. 

She's having no fevers coughs chills or shortness of breath. Deep inspirations 

do not make the pain worse. She has no known history of heart disease but she 

does have high cholesterol and diabetes. She does not know her father's history 

but her mom has no significant cardiac history. She has no history of familial 

or personal blood clots. No recent surgeries or periods of immobility or travel.

She is not having any nausea with her discomfort. She has no history of asthma 

or COPD. She quit smoking almost 20 years ago. No history of high blood pressure

or familial early onset coronary disease. She rates the pain as 3 out of 10, 

dull.





Allergies and Home Medications


Allergies


Coded Allergies:  


     No Known Drug Allergies (Unverified , 18)





Home Medications


Docusate Sodium 100 Mg Capsule, 100 MG PO BID PRN for CONSTIPATION-1ST LINE


   Prescribed by: MIRTHA ROMERO on 18


Hydrocodone Bit/Acetaminophen 1 Tab Tab, 1-2 TAB PO Q4H PRN for PAIN-MODERATE


   Prescribed by: MIRTHA ROMERO on 18


Ibuprofen 600 Mg Tablet, 600 MG PO Q6H


   Prescribed by: MIRTHA ROMERO on 18


Metformin HCl 500 Mg Tablet, 1,000 PO BID, (Reported)


   take 2 in am and 1 at night 


Gsf003/Iron Fumarate/FA/Dss 1 Each Tablet, 1 EACH PO DAILY, (Reported)





Patient Home Medication List


Home Medication List Reviewed:  Yes





Review of Systems


Review of Systems


Constitutional:  No chills, No diaphoresis


EENTM:  No Blurred Vision, No Double Vision


Respiratory:  Denies Cough, Denies Shortness of Air


Cardiovascular:  See HPI, Chest Pain; Denies Edema, Denies Lightheadedness


Gastrointestinal:  Denies Abdomen Distended, Denies Abdominal Pain, Denies 

Constipated, Denies Diarrhea, Denies Nausea, Denies Vomiting


Genitourinary:  Denies Burning, Denies Discharge


Musculoskeletal:  No back pain, No joint pain


Skin:  No change in color, No pruritus





All Other Systems Reviewed


Negative Unless Noted:  Yes





Past Medical-Social-Family Hx


Patient Social History


Alcohol Use:  Denies Use


Recreational Drug Use:  No


Smoking Status:  Former Smoker


Type Used:  Cigarettes


Former Smoker, Quit:  2000


Recent Foreign Travel:  No


Contact w/Someone Who Travel:  No


Recent Hopitalizations:  No





Immunizations Up To Date


Tetanus Booster (TDap):  Unknown





Seasonal Allergies


Seasonal Allergies:  No





Past Medical History


Surgeries:  Yes (LIPOMA FROM ABDX2,  2 )


Appendectomy,  Section


Respiratory:  No


Cardiac:  No


Neurological:  No


Reproductive Disorders:  No


Female Reproductive Disorders:  Menstrual Problems


Sexually Transmitted Disease:  No


HIV/AIDS:  No


Genitourinary:  No


Gastrointestinal:  Yes


Gastroesophageal Reflux


Musculoskeletal:  No


Endocrine:  Yes (GESTATIONAL DIABETES)


HEENT:  No


Loss of Vision:  Denies


Hearing Impairment:  Denies


Cancer:  No


Psychosocial:  Yes (HX OF ANXIETY)


Anxiety, Depression


Integumentary:  No


Blood Disorders:  No


Adverse Reaction/Blood Tranf:  No (N/A)





Physical Exam


Vital Signs





Vital Signs - First Documented








 20





 23:45


 


Temp 36.9


 


Pulse 85


 


Resp 20


 


B/P (MAP) 132/69 (90)


 


Pulse Ox 98


 


O2 Delivery Room Air





Capillary Refill :


Height, Weight, BMI


Height: 5'6.00"


Weight: 228lbs. 0.0oz. [id number]; 37.8 BMI


Method:Stated


General Appearance:  WD/WN, Anxious


HEENT:  PERRL/EOMI, Normal ENT Inspection, Pharynx Normal, Moist Mucous 

Membranes


Neck:  Full Range of Motion, Normal Inspection


Respiratory:  No Chest Non Tender; Lungs Clear, Normal Breath Sounds, No 

Accessory Muscle Use, No Respiratory Distress


Cardiovascular:  Regular Rate, Rhythm, No Edema, Normal Peripheral Pulses


Gastrointestinal:  Normal Bowel Sounds, Non Tender, Soft, Other (negative for 

Cavanaugh sign, Rovsing sign or McBurney's point tenderness or rebound tenderness.)


Extremity:  Normal Capillary Refill, Normal Inspection, No Calf Tenderness, No 

Pedal Edema


Neurologic/Psychiatric:  Alert, Oriented x3, Other (anxious, sometimes tearful 

affect)


Skin:  Normal Color, Warm/Dry





Progress/Results/Core Measures


Results/Orders


Lab Results





Laboratory Tests








Test


 20


23:57 4/3/20


00:25 4/3/20


00:58 Range/Units


 


 


White Blood Count


 15.3 H


 


 


 4.3-11.0


10^3/uL


 


Red Blood Count


 4.59 


 


 


 4.35-5.85


10^6/uL


 


Hemoglobin 13.1    11.5-16.0  G/DL


 


Hematocrit 39    35-52  %


 


Mean Corpuscular Volume 85    80-99  FL


 


Mean Corpuscular Hemoglobin 29    25-34  PG


 


Mean Corpuscular Hemoglobin


Concent 33 


 


 


 32-36  G/DL





 


Red Cell Distribution Width 12.8    10.0-14.5  %


 


Platelet Count


 357 


 


 


 130-400


10^3/uL


 


Mean Platelet Volume 11.0 H   7.4-10.4  FL


 


Neutrophils (%) (Auto) 60    42-75  %


 


Lymphocytes (%) (Auto) 32    12-44  %


 


Monocytes (%) (Auto) 7    0-12  %


 


Eosinophils (%) (Auto) 1    0-10  %


 


Basophils (%) (Auto) 0    0-10  %


 


Neutrophils # (Auto) 9.2 H   1.8-7.8  X 10^3


 


Lymphocytes # (Auto) 4.9 H   1.0-4.0  X 10^3


 


Monocytes # (Auto) 1.1 H   0.0-1.0  X 10^3


 


Eosinophils # (Auto)


 0.1 


 


 


 0.0-0.3


10^3/uL


 


Basophils # (Auto)


 0.0 


 


 


 0.0-0.1


10^3/uL


 


Neutrophils % (Manual) 62     %


 


Lymphocytes % (Manual) 32     %


 


Monocytes % (Manual) 5     %


 


Eosinophils % (Manual) 1     %


 


Blood Morphology Comment NORMAL     


 


Prothrombin Time 13.5    12.2-14.7  SEC


 


INR Comment 1.0    0.8-1.4  


 


Activated Partial


Thromboplast Time 36 H


 


 


 24-35  SEC





 


D-Dimer


 0.96 H


 


 


 0.00-0.49


UG/ML


 


Sodium Level 138    135-145  MMOL/L


 


Potassium Level 4.2    3.6-5.0  MMOL/L


 


Chloride Level 105      MMOL/L


 


Carbon Dioxide Level 19 L   21-32  MMOL/L


 


Anion Gap 14    5-14  MMOL/L


 


Blood Urea Nitrogen 14    7-18  MG/DL


 


Creatinine


 0.86 


 


 


 0.60-1.30


MG/DL


 


Estimat Glomerular Filtration


Rate > 60 


 


 


  





 


BUN/Creatinine Ratio 16     


 


Glucose Level 105      MG/DL


 


Calcium Level 9.2    8.5-10.1  MG/DL


 


Corrected Calcium 9.0    8.5-10.1  MG/DL


 


Magnesium Level 1.9    1.6-2.4  MG/DL


 


Total Bilirubin 0.4    0.1-1.0  MG/DL


 


Aspartate Amino Transf


(AST/SGOT) 17 


 


 


 5-34  U/L





 


Alanine Aminotransferase


(ALT/SGPT) 18 


 


 


 0-55  U/L





 


Alkaline Phosphatase 47      U/L


 


Myoglobin


 44.9 


 


 


 10.0-92.0


NG/ML


 


Troponin I < 0.028    <0.028  NG/ML


 


Total Protein 7.7    6.4-8.2  GM/DL


 


Albumin 4.3    3.2-4.5  GM/DL


 


Lipase 47    8-78  U/L


 


Urine Opiates Screen  NEGATIVE   NEGATIVE  


 


Urine Oxycodone Screen  NEGATIVE   NEGATIVE  


 


Urine Methadone Screen  NEGATIVE   NEGATIVE  


 


Urine Propoxyphene Screen  NEGATIVE   NEGATIVE  


 


Urine Barbiturates Screen  NEGATIVE   NEGATIVE  


 


Ur Tricyclic Antidepressants


Screen 


 NEGATIVE 


 


 NEGATIVE  





 


Urine Phencyclidine Screen  NEGATIVE   NEGATIVE  


 


Urine Amphetamines Screen  POSITIVE H  NEGATIVE  


 


Urine Methamphetamines Screen  NEGATIVE   NEGATIVE  


 


Urine Benzodiazepines Screen  NEGATIVE   NEGATIVE  


 


Urine Cocaine Screen  NEGATIVE   NEGATIVE  


 


Urine Cannabinoids Screen  POSITIVE H  NEGATIVE  


 


Group A Streptococcus Screen   NEGATIVE  NEGATIVE  








My Orders





Orders - HILLARY,KEELY J


Ekg Tracing (20 23:53)


Continuous Ekg Monitoring (20 23:53)


Aspirin Chewable Tablet (Baby Aspirin Ch (20 23:57)


Cbc With Automated Diff (4/3/20 00:09)


Magnesium (4/3/20 00:09)


Chest 1 View, Ap/Pa Only (4/3/20 00:09)


Comprehensive Metabolic Panel (4/3/20 00:09)


Myoglobin Serum (4/3/20 00:09)


Protime With Inr (4/3/20 00:09)


Partial Thromboplastin Time (4/3/20 00:09)


O2 (4/3/20 00:09)


Lipid Panel (20 06:00)


Ed Iv/Invasive Line Start (4/3/20 00:09)


Lipase (4/3/20 00:09)


Troponin I (4/3/20 00:09)


Aspirin Chewable Tablet (Baby Aspirin Ch (4/3/20 00:15)


Lidocaine 2% Viscous 15 Ml (Xylocaine Vi (4/3/20 00:15)


Famotidine Tablet (Pepcid Tablet) (4/3/20 00:09)


Antacid  Suspension (Mylanta  Suspension (4/3/20 00:15)


Urine Pregnancy Bedside (4/3/20 00:13)


Drug Screen Stat (Urine) (4/3/20 00:13)


Fibrin Degradation Products (20 23:57)


Manual Differential (20 23:57)


Ketorolac Injection (Toradol Injection) (4/3/20 01:00)


Rapid Strep A Screen (4/3/20 01:28)





Medications Given in ED





Current Medications








 Medications  Dose


 Ordered  Sig/Ashley


 Route  Start Time


 Stop Time Status Last Admin


Dose Admin


 


 Al Hydrox/Mg


 Hydrox/Simethicone  30 ml  ONCE  ONCE


 PO  4/3/20 00:15


 4/3/20 00:16 DC 4/3/20 00:25


30 ML


 


 Aspirin  81 mg  STK-MED ONCE


 .ROUTE  20 23:57


 4/3/20 00:04 DC 4/3/20 00:06


324 MG


 


 Ketorolac


 Tromethamine  30 mg  ONCE  ONCE


 IVP  4/3/20 01:00


 4/3/20 01:01 DC 4/3/20 01:03


30 MG


 


 Lidocaine HCl  15 ml  ONCE  ONCE


 PO  4/3/20 00:15


 4/3/20 00:16 DC 4/3/20 00:25


15 ML








Vital Signs/I&O











 20





 23:45 23:45


 


Temp  36.9


 


Pulse  85


 


Resp  20


 


B/P (MAP)  132/69 (90)


 


Pulse Ox  98


 


O2 Delivery Room Air 











Progress


Progress Note #1:  


   Time:  00:19


Progress Note


We did offer the patient something for her anxiety and she declined at this 

time. We have discussed appropriate workup to include aspirin, GI cocktail, 

chest x-ray EKG. Possibilities include costochondritis since its reproducible, 

less likely pleurisy, myocarditis/pericarditis. Says the pains been going on all

day a troponin should be sufficient to rule out significant coronary disease 

given her lack of symptoms. If the GI cocktail does not help her pain and we may

try Toradol.


Wells' score for pulmonary embolism: 0.0 points; Low risk group: 1.3% chance of 

PE in an ED population.


PERC 0 criteria; No need for further workup, as <2% chance of PE.


Progress Note #2:  


   Time:  00:55


Progress Note


GI cocktail made little difference. The patient's pain is still reproducible to 

direct palpation. She has an upper respiratory symptoms. Suspect she is having 

body aches and costochondritis. We are going to give her some Toradol and a 

rapid strep.


Progress Note #3:  


   Time:  01:54


Progress Note


Rapid strep is negative, viral pharyngitis is most likely source of her symptoms

and labs as well as her costochondritis. Toradol took her pain down to 0.


Initial ECG Impression Date:  2020


Initial ECG Impression Time:  23:58


Initial ECG Rate:  80


Initial ECG Rhythm:  Normal Sinus


Initial ECG Intervals:  Normal


Initial ECG Impression:  Normal, Nonspecific Changes


Comment


Normal sinus rhythm without clinically relevant ST elevation or depression.





Diagnostic Imaging





   Diagonstic Imaging:  Xray


   Plain Films/CT/US/NM/MRI:  chest (one view)


Comments


No acute cardiopulmonary process noted on one view chest x-ray.


   Reviewed:  Reviewed by Me





Departure


Impression





   Primary Impression:  


   Costochondritis, acute


   Additional Impression:  


   Acute viral pharyngitis


Disposition:   HOME, SELF-CARE


Condition:  Stable





Departure-Patient Inst.


Decision time for Depature:  01:54


Referrals:  


[facility] (PCP/Family)


Primary Care Physician


Patient Instructions:  Chest Pain That Is Not Caused by the Heart (DC), 

Costochondritis





Add. Discharge Instructions:  


I suspect your pain is related to costochondritis which is caused by irritation 

of the joints of your ribs and chest wall.


Tylenol 1000 mg every 8 hours as needed for pain in addition to naproxen 1-2 

capsules twice a day can be very useful for the pain you're expressing any chest

wall.


It should resolve usually in 1-2 weeks on its own.


Drink plenty of fluids and use vapor rubs such as Vicks or Mentholatum 

liberally.


All discharge instructions reviewed with patient and/or family. Voiced 

understanding.











KEELY THORNTON                  Apr 3, 2020 00:21

## 2021-04-25 ENCOUNTER — HOSPITAL ENCOUNTER (EMERGENCY)
Dept: HOSPITAL 75 - ER | Age: 41
Discharge: LEFT BEFORE DISCHARGE | End: 2021-04-25
Payer: MEDICAID

## 2021-04-25 VITALS — HEIGHT: 66.02 IN | BODY MASS INDEX: 33.8 KG/M2 | WEIGHT: 210.32 LBS

## 2021-04-25 VITALS — DIASTOLIC BLOOD PRESSURE: 116 MMHG | SYSTOLIC BLOOD PRESSURE: 150 MMHG

## 2021-04-25 DIAGNOSIS — R53.1: ICD-10-CM

## 2021-04-25 DIAGNOSIS — F41.9: ICD-10-CM

## 2021-04-25 DIAGNOSIS — Z79.84: ICD-10-CM

## 2021-04-25 DIAGNOSIS — E11.9: ICD-10-CM

## 2021-04-25 DIAGNOSIS — Z87.891: ICD-10-CM

## 2021-04-25 DIAGNOSIS — R00.2: Primary | ICD-10-CM

## 2021-04-25 LAB
ALBUMIN SERPL-MCNC: 4.7 GM/DL (ref 3.2–4.5)
ALP SERPL-CCNC: 55 U/L (ref 40–136)
ALT SERPL-CCNC: 22 U/L (ref 0–55)
BASOPHILS # BLD AUTO: 0.1 10^3/UL (ref 0–0.1)
BASOPHILS NFR BLD AUTO: 0 % (ref 0–10)
BILIRUB SERPL-MCNC: 0.5 MG/DL (ref 0.1–1)
BUN/CREAT SERPL: 16
CALCIUM SERPL-MCNC: 10.1 MG/DL (ref 8.5–10.1)
CHLORIDE SERPL-SCNC: 97 MMOL/L (ref 98–107)
CO2 SERPL-SCNC: 27 MMOL/L (ref 21–32)
CREAT SERPL-MCNC: 0.82 MG/DL (ref 0.6–1.3)
EOSINOPHIL # BLD AUTO: 0.1 10^3/UL (ref 0–0.3)
EOSINOPHIL NFR BLD AUTO: 1 % (ref 0–10)
GFR SERPLBLD BASED ON 1.73 SQ M-ARVRAT: > 60 ML/MIN
GLUCOSE SERPL-MCNC: 109 MG/DL (ref 70–105)
HCT VFR BLD CALC: 43 % (ref 35–52)
HGB BLD-MCNC: 13.9 G/DL (ref 11.5–16)
LYMPHOCYTES # BLD AUTO: 3.4 10^3/UL (ref 1–4)
LYMPHOCYTES NFR BLD AUTO: 25 % (ref 12–44)
MAGNESIUM SERPL-MCNC: 1.7 MG/DL (ref 1.6–2.4)
MANUAL DIFFERENTIAL PERFORMED BLD QL: NO
MCH RBC QN AUTO: 28 PG (ref 25–34)
MCHC RBC AUTO-ENTMCNC: 33 G/DL (ref 32–36)
MCV RBC AUTO: 87 FL (ref 80–99)
MONOCYTES # BLD AUTO: 1 10^3/UL (ref 0–1)
MONOCYTES NFR BLD AUTO: 8 % (ref 0–12)
NEUTROPHILS # BLD AUTO: 8.9 10^3/UL (ref 1.8–7.8)
NEUTROPHILS NFR BLD AUTO: 66 % (ref 42–75)
PLATELET # BLD: 383 10^3/UL (ref 130–400)
PMV BLD AUTO: 10.7 FL (ref 9–12.2)
POTASSIUM SERPL-SCNC: 3.9 MMOL/L (ref 3.6–5)
PROT SERPL-MCNC: 8.5 GM/DL (ref 6.4–8.2)
SODIUM SERPL-SCNC: 135 MMOL/L (ref 135–145)
TSH SERPL DL<=0.05 MIU/L-ACNC: 3.53 UIU/ML (ref 0.35–4.94)
WBC # BLD AUTO: 13.4 10^3/UL (ref 4.3–11)

## 2021-04-25 PROCEDURE — 36415 COLL VENOUS BLD VENIPUNCTURE: CPT

## 2021-04-25 PROCEDURE — 93005 ELECTROCARDIOGRAM TRACING: CPT

## 2021-04-25 PROCEDURE — 83735 ASSAY OF MAGNESIUM: CPT

## 2021-04-25 PROCEDURE — 85025 COMPLETE CBC W/AUTO DIFF WBC: CPT

## 2021-04-25 PROCEDURE — 84703 CHORIONIC GONADOTROPIN ASSAY: CPT

## 2021-04-25 PROCEDURE — 86141 C-REACTIVE PROTEIN HS: CPT

## 2021-04-25 PROCEDURE — 80053 COMPREHEN METABOLIC PANEL: CPT

## 2021-04-25 PROCEDURE — 93041 RHYTHM ECG TRACING: CPT

## 2021-04-25 PROCEDURE — 84443 ASSAY THYROID STIM HORMONE: CPT

## 2021-04-25 PROCEDURE — 84484 ASSAY OF TROPONIN QUANT: CPT

## 2021-04-25 NOTE — ED NEUROLOGICAL PROBLEM
General


Chief Complaint:  Neurological Problems


Stated Complaint:  L ARM NUMBNESS/LETHARGIC


Nursing Triage Note:  


AMB TO ROOM WITHOUT PROBLEM REPORTS ABUT 1630 DRANK COFFEE WITH A SHOOT OF 


ESPRESSO  AFTER  HEART FELT LIKE IT WAS RACING,SHAKEY PRESSURE IN BACK OF NECK 


AND BOTH ARMS FELT WEAK. ON  ADMIT FEEING ANXIOUS


Nursing Sepsis Screen:  No Definite Risk


Source:  patient


Exam Limitations:  no limitations





History of Present Illness


Date Seen by Provider:  2021


Time Seen by Provider:  18:15


Initial Comments


This 40-year-old woman presents to the emergency room with a constellation of 

symptoms that include shakiness, lightheadedness, palpitations, shortness of 

air, cloudy mentation, confusing left and right, generalized weakness, and a 

pressure-like sensation in the back of her neck and into her left arm.  She did 

not describe any focal weakness.  Symptoms are now improving.  She denies any 

pain.  Symptoms started after drinking a strong espresso drink at about 16:30.  

She also reports drinking coffee this morning and not eating well today.  She is

diabetic and does not monitor her blood sugars because she lost her glucometer. 

She denies ever having symptoms like this in the past.  She seems rather anxious

at this time.  She initially denied any drug or alcohol use.  However, it was 

noted that there was marijuana in the urine drug screen about a year ago.  I 

inquired again about marijuana use and she admits to using marijuana within the 

past week.  She also tried a vape of an unknown product offered to her by 

someone at work yesterday.  She presumed that her symptoms were due to caffeine 

overdose.  She induced vomiting to try to expel the excess caffeine.  She ate 

and drink much water after the episode to try to help her flush out the 

caffeine.  At this time she primarily just feels anxious and weak.  She has been

taking a couple of supplements recently including one called Brain Food by [employer].  It has numerous herbs and other additives.  This product claims to 

boost acetylcholine levels.





Allergies and Home Medications


Allergies


Coded Allergies:  


     No Known Drug Allergies (Unverified , 18)





Home Medications


Docusate Sodium 100 Mg Capsule, 100 MG PO BID PRN for CONSTIPATION-1ST LINE


   Prescribed by: MIRTHA ROMERO on 18 1745


Hydrocodone Bit/Acetaminophen 1 Tab Tab, 1-2 TAB PO Q4H PRN for PAIN-MODERATE


   Prescribed by: MIRTHA ROMERO on 18


Ibuprofen 600 Mg Tablet, 600 MG PO Q6H


   Prescribed by: MIRTHA ROMERO on 18


Metformin HCl 500 Mg Tablet, 1,000 PO BID, (Reported)


   take 2 in am and 1 at night 


Lhl288/Iron Fumarate/FA/Dss 1 Each Tablet, 1 EACH PO DAILY, (Reported)





Patient Home Medication List


Home Medication List Reviewed:  Yes





Review of Systems


Review of Systems


Constitutional:  see HPI


Eyes:  No Symptoms Reported


Ears, Nose, Mouth, Throat:  no symptoms reported


Respiratory:  see HPI


Cardiovascular:  see HPI


Gastrointestinal:  see HPI


Genitourinary:  no symptoms reported


Pregnant:  No


LMP:  Mar 13, 2021


Musculoskeletal:  see HPI


Skin:  no symptoms reported


Psychiatric/Neurological:  See HPI


Endocrine:  See HPI


Hematologic/Lymphatic:  No Symptoms Reported





Past Medical-Social-Family Hx


Past Med/Social Hx:  Reviewed and Corrections made


Patient Social History


Alcohol Use:  Occasionally Uses


Drug of Choice:  Marijuana


Smoking Status:  Former Smoker


Type Used:  Cigarettes


Former Smoker, Quit:  2000


Recent Infectious Disease Expo:  No


Recent Hopitalizations:  No





Immunizations Up To Date


Tetanus Booster (TDap):  Unknown





Seasonal Allergies


Seasonal Allergies:  No





Past Medical History


Surgeries:  Yes (LIPOMA FROM ABDX2,  2 )


Appendectomy,  Section


Respiratory:  No


Cardiac:  No


Neurological:  No


Reproductive Disorders:  Yes


Female Reproductive Disorders:  Menstrual Problems


Sexually Transmitted Disease:  No


HIV/AIDS:  No


Genitourinary:  No


Gastrointestinal:  Yes


Gastroesophageal Reflux


Musculoskeletal:  No


Endocrine:  Yes (GESTATIONAL DIABETES)


Diabetes, Non-Insulin dep


HEENT:  No


Loss of Vision:  Denies


Hearing Impairment:  Denies


Cancer:  No


Psychosocial:  Yes (HX OF ANXIETY)


Anxiety, Depression


Integumentary:  No


Blood Disorders:  No


Adverse Reaction/Blood Tranf:  No (N/A)





Physical Exam


Vital Signs





Vital Signs - First Documented








 21





 18:15


 


Temp 36.5


 


Pulse 91


 


Resp 18


 


B/P (MAP) 150/116 (127)


 


Pulse Ox 100


 


O2 Delivery Room Air





Capillary Refill : Less Than 3 Seconds


Height, Weight, BMI


Height: 5'6.00"


Weight: 228lbs. 0.0oz. [id number]; 33.00 BMI


Method:Stated


General Appearance:  WD/WN, no apparent distress


HEENT:  PERRL/EOMI, normal ENT inspection


Neck:  normal inspection


Respiratory:  lungs clear, normal breath sounds, no respiratory distress


Cardiovascular:  regular rate, rhythm, no edema, no murmur


Gastrointestinal:  normal bowel sounds, non tender, soft


Extremities:  normal inspection, no pedal edema


Neurologic/Psychiatric:  CNs II-XII nml as tested, no motor/sensory deficits, 

alert, normal mood/affect, oriented x 3


Crainal Nerves:  normal hearing, normal speech, PERRL


Coordination/Gait:  normal finger to nose (Normal heel-to-shin), normal gait


Motor/Sensory:  no motor deficit, no sensory deficit


Skin:  normal color, warm/dry





Progress/Results/Core Measures


Results/Orders


Lab Results





Laboratory Tests








Test


 21


18:30 Range/Units


 


 


White Blood Count


 13.4 H


 4.3-11.0


10^3/uL


 


Red Blood Count


 4.90 


 3.80-5.11


10^6/uL


 


Hemoglobin 13.9  11.5-16.0  g/dL


 


Hematocrit 43  35-52  %


 


Mean Corpuscular Volume 87  80-99  fL


 


Mean Corpuscular Hemoglobin 28  25-34  pg


 


Mean Corpuscular Hemoglobin


Concent 33 


 32-36  g/dL





 


Red Cell Distribution Width 12.2  10.0-14.5  %


 


Platelet Count


 383 


 130-400


10^3/uL


 


Mean Platelet Volume 10.7  9.0-12.2  fL


 


Immature Granulocyte % (Auto) 0   %


 


Neutrophils (%) (Auto) 66  42-75  %


 


Lymphocytes (%) (Auto) 25  12-44  %


 


Monocytes (%) (Auto) 8  0-12  %


 


Eosinophils (%) (Auto) 1  0-10  %


 


Basophils (%) (Auto) 0  0-10  %


 


Neutrophils # (Auto)


 8.9 H


 1.8-7.8


10^3/uL


 


Lymphocytes # (Auto)


 3.4 


 1.0-4.0


10^3/uL


 


Monocytes # (Auto)


 1.0 


 0.0-1.0


10^3/uL


 


Eosinophils # (Auto)


 0.1 


 0.0-0.3


10^3/uL


 


Basophils # (Auto)


 0.1 


 0.0-0.1


10^3/uL


 


Immature Granulocyte # (Auto)


 0.0 


 0.0-0.1


10^3/uL


 


Sodium Level 135  135-145  MMOL/L


 


Potassium Level 3.9  3.6-5.0  MMOL/L


 


Chloride Level 97 L   MMOL/L


 


Carbon Dioxide Level 27  21-32  MMOL/L


 


Anion Gap 11  5-14  MMOL/L


 


Blood Urea Nitrogen 13  7-18  MG/DL


 


Creatinine


 0.82 


 0.60-1.30


MG/DL


 


Estimat Glomerular Filtration


Rate > 60 


  





 


BUN/Creatinine Ratio 16   


 


Glucose Level 109 H   MG/DL


 


Calcium Level 10.1  8.5-10.1  MG/DL


 


Corrected Calcium   8.5-10.1  MG/DL


 


Magnesium Level 1.7  1.6-2.4  MG/DL


 


Total Bilirubin 0.5  0.1-1.0  MG/DL


 


Aspartate Amino Transf


(AST/SGOT) 20 


 5-34  U/L





 


Alanine Aminotransferase


(ALT/SGPT) 22 


 0-55  U/L





 


Alkaline Phosphatase 55    U/L


 


Troponin I < 0.028  <0.028  NG/ML


 


C-Reactive Protein High


Sensitivity 0.86 H


 0.00-0.50


MG/DL


 


Total Protein 8.5 H 6.4-8.2  GM/DL


 


Albumin 4.7 H 3.2-4.5  GM/DL


 


TSH Cascade Testing


 3.53 


 0.35-4.94


UIU/ML


 


Serum Pregnancy Test,


Qualitative NEGATIVE 


 NEGATIVE  











My Orders





Orders - MIGUELITO AVALOS MD


Cbc With Automated Diff (21 18:38)


Comprehensive Metabolic Panel (21 18:38)


Magnesium (21 18:38)


Thyroid Analyzer (21 18:38)


Ua Culture If Indicated (21 18:38)


Ekg Tracing (21 18:38)


Monitor-Rhythm Ecg Trace Only (21 18:38)


Troponin I (21 18:38)


Hs C Reactive Protein (21 19:05)


Hcg,Qualitative Serum (21 19:05)





Vital Signs/I&O











 21





 18:15 18:50


 


Temp 36.5 


 


Pulse 91 94


 


Resp 18 18


 


B/P (MAP) 150/116 (127) 137/91 (106)


 


Pulse Ox 100 99


 


O2 Delivery Room Air Room Air














Blood Pressure Mean:                    106











Progress


Progress Note :  


   Time:  19:30


Progress Note


Patient was seen and examined promptly after arrival.  She had no focal deficits

on examination.  She was feeling better by the time of the exam and continued to

improve during her time in the ER.  Labs and EKG were obtained.  She was ob

served on the cardiac monitor.  Since patient admitted to smoking marijuana, I 

offered a drug screen to determine if there was a cross exposure with other 

substances.  She declined.  She ultimately decided to leave AGAINST MEDICAL 

ADVICE prior to completion of her work-up.  She now believes her symptoms were 

due to excess caffeine.  I urged her to stay until lab results were available.  

She declined and signed an AMA paper.  Labs resulted a few minutes later and 

were relatively unremarkable.  Patient did not want to provide a urine specimen 

so no urinalysis was available.  Patient was contacted with her lab results by 

phone.


Initial ECG Impression Date:  2021


Initial ECG Impression Time:  18:30


Initial ECG Rate:  90


Initial ECG Rhythm:  Normal Sinus


Initial ECG Intervals:  Normal


Initial ECG Impression:  Normal


Comment


Normal sinus rhythm with no ST elevation or depression.  No abnormal intervals 

or axis deviation.





Departure


Impression





   Primary Impression:  


   Palpitations


   Additional Impressions:  


   Anxiety


   Asthenia


Disposition:  07 AGAINST MEDICAL ADVICE


Condition:  Against Medical Advice





Departure-Patient Inst.


Referrals:  


[facility] (PCP/Family)


Primary Care Physician











MIGUELITO AVALOS MD        2021 19:05

## 2021-08-01 ENCOUNTER — HOSPITAL ENCOUNTER (EMERGENCY)
Dept: HOSPITAL 75 - ER | Age: 41
LOS: 1 days | Discharge: HOME | End: 2021-08-02
Payer: MEDICAID

## 2021-08-01 VITALS — WEIGHT: 214.95 LBS | BODY MASS INDEX: 34.55 KG/M2 | HEIGHT: 66.14 IN

## 2021-08-01 DIAGNOSIS — E11.9: ICD-10-CM

## 2021-08-01 DIAGNOSIS — Z79.84: ICD-10-CM

## 2021-08-01 DIAGNOSIS — U07.1: Primary | ICD-10-CM

## 2021-08-01 PROCEDURE — 99283 EMERGENCY DEPT VISIT LOW MDM: CPT

## 2021-08-01 PROCEDURE — 87636 SARSCOV2 & INF A&B AMP PRB: CPT

## 2021-08-01 PROCEDURE — 81000 URINALYSIS NONAUTO W/SCOPE: CPT

## 2021-08-01 PROCEDURE — 84703 CHORIONIC GONADOTROPIN ASSAY: CPT

## 2021-08-02 VITALS — SYSTOLIC BLOOD PRESSURE: 104 MMHG | DIASTOLIC BLOOD PRESSURE: 94 MMHG

## 2021-08-02 LAB
APTT PPP: YELLOW S
BACTERIA #/AREA URNS HPF: (no result) /HPF
BILIRUB UR QL STRIP: NEGATIVE
FIBRINOGEN PPP-MCNC: (no result) MG/DL
GLUCOSE UR STRIP-MCNC: NEGATIVE MG/DL
KETONES UR QL STRIP: NEGATIVE
LEUKOCYTE ESTERASE UR QL STRIP: NEGATIVE
NITRITE UR QL STRIP: NEGATIVE
PH UR STRIP: 5.5 [PH] (ref 5–9)
PROT UR QL STRIP: (no result)
SP GR UR STRIP: >=1.03 (ref 1.02–1.02)
SQUAMOUS #/AREA URNS HPF: >50 /HPF
WBC #/AREA URNS HPF: (no result) /HPF

## 2021-08-02 NOTE — ED COUGH/URI
General


Chief Complaint:  Cough/Cold/Flu Symptoms


Stated Complaint:  FEVER/HEADACHE/WEAKNESS


Nursing Triage Note:  


chills, fatigue, right back pain since thursday.





Allergies and Home Medications


Allergies


Coded Allergies:  


     No Known Drug Allergies (Unverified , 18)





Home Medications


Docusate Sodium 100 Mg Capsule, 100 MG PO BID PRN for CONSTIPATION-1ST LINE


   Prescribed by: MIRTHA ROMERO on 18


Hydrocodone Bit/Acetaminophen 1 Tab Tab, 1-2 TAB PO Q4H PRN for PAIN-MODERATE


   Prescribed by: MIRTHA ROMERO on 18


Ibuprofen 600 Mg Tablet, 600 MG PO Q6H


   Prescribed by: MIRTHA ROMERO on 18


Metformin HCl 500 Mg Tablet, 1,000 PO BID, (Reported)


   take 2 in am and 1 at night 


Ondansetron 8 Mg Tab.rapdis, 8 MG PO Q4H PRN for NAUSEA/VOMITING


   Prescribed by: MARIVEL STEWART on 21 0156


Mnv481/Iron Fumarate/FA/Dss 1 Each Tablet, 1 EACH PO DAILY, (Reported)





Past Medical-Social-Family Hx


Patient Social History


Tobacco Use?:  No


Substance use?:  Yes


Substance type:  Marijuana


Alcohol Use?:  No


Pt feels they are or have been:  No





Immunizations Up To Date


Tetanus Booster (TDap):  Unknown





Seasonal Allergies


Seasonal Allergies:  No





Past Medical History


Surgeries:  Yes (LIPOMA FROM ABDX2,  2 )


Appendectomy,  Section


Respiratory:  No


Cardiac:  No


Neurological:  No


Reproductive Disorders:  Yes


Female Reproductive Disorders:  Menstrual Problems


Sexually Transmitted Disease:  No


HIV/AIDS:  No


Genitourinary:  No


Gastrointestinal:  Yes


Gastroesophageal Reflux


Musculoskeletal:  No


Endocrine:  Yes (GESTATIONAL DIABETES)


Diabetes, Non-Insulin dep


HEENT:  No


Loss of Vision:  Denies


Hearing Impairment:  Denies


Cancer:  No


Psychosocial:  Yes (HX OF ANXIETY)


Anxiety, Depression


Integumentary:  No


Blood Disorders:  No


Adverse Reaction/Blood Tranf:  No (N/A)





Physical Exam





Vital Signs - First Documented




















Capillary Refill : Less Than 3 Seconds


Height: 5'6.00"


Weight: 228lbs. 0.0oz. 103.419388ro; 34.00 BMI


Method:Stated





Progress/Results/Core Measures


Suspected Sepsis


SIRS


Temperature: 


Pulse: 103 


Respiratory Rate: 18


 


Blood Pressure 130 /78 


Mean: 95





Results/Orders


Lab Results





Laboratory Tests








Test


 21


23:38 Range/Units


 


 


Urine Color YELLOW   


 


Urine Clarity CLOUDY   


 


Urine pH 5.5  5-9  


 


Urine Specific Gravity >=1.030  1.016-1.022  


 


Urine Protein TRACE H NEGATIVE  


 


Urine Glucose (UA) NEGATIVE  NEGATIVE  


 


Urine Ketones NEGATIVE  NEGATIVE  


 


Urine Nitrite NEGATIVE  NEGATIVE  


 


Urine Bilirubin NEGATIVE  NEGATIVE  


 


Urine Urobilinogen 0.2  < = 1.0  MG/DL


 


Urine Leukocyte Esterase NEGATIVE  NEGATIVE  


 


Urine RBC (Auto) 3+ H NEGATIVE  


 


Urine RBC 10-25 H  /HPF


 


Urine WBC 0-2   /HPF


 


Urine Squamous Epithelial


Cells >50 H


  /HPF





 


Urine Crystals NONE   /LPF


 


Urine Bacteria LARGE H  /HPF


 


Urine Casts NONE   /LPF


 


Urine Mucus NEGATIVE   /LPF


 


Urine Culture Indicated NO   


 


Urine Pregnancy Test NEGATIVE  NEGATIVE  


 


SARS-CoV-2 RNA (RT-PCR) Detected H Not Detecte  








My Orders





Orders - MARIVEL STEWART DO


Covid 19 Inhouse Test (21 23:23)


Hcg,Qualitative Urine (21 23:55)


Ua Culture If Indicated (21 23:55)


Rx-Ondansetron Po (Rx-Zofran Po) (21 01:58)





Vital Signs/I&O











 21





 23:23 23:23


 


Temp 37.8 


 


Pulse 103 


 


Resp 18 


 


B/P (MAP) 130/78 (95) 


 


Pulse Ox 98 


 


O2 Delivery Room Air Room Air





Capillary Refill : Less Than 3 Seconds








Blood Pressure Mean:                    95











Departure


Impression





   Primary Impression:  


   COVID-19 virus infection


Disposition:  01 HOME, SELF-CARE


Condition:  Stable





Departure-Patient Inst.


Referrals:  


[facility] (PCP/Family)


Primary Care Physician


Patient Instructions:  COVID-19 (DC), Preventing the Spread of an Infectious 

Disease, REGEN-COV (casirivimab and imdevimab) FDA Fact Sheet, Urinary Tract 

Infection, Adult (DC)





Add. Discharge Instructions:  


LOTS OF CLEAR LIQUIDS--WATER, BROTH, JELLO, GATORADE--DRINK ENOUGH SO YOU ARE 

URINATING EVERY 2-3 HOURS WHILE AWAKE





BRATS DIET--BANANAS, RICE, APPLESAUCE, TOAST, SALTINES





TYLENOL 1 GRAM/ MOTRIN 800 MG 4 TIMES A DAY FOR PAIN OR FEVER





OVER THE COUNTER MEDICATIONS AS NEEDED FOR COUGH AND CONGESTION





FOLLOW UP WITH [facility] IN 5-7 DAYS IF NO BETTER, RETURN TO ER IF WORSE





QUARANTINE YOURSELF AND ALL HOUSEHOLD MEMBERS AND CLOSE CONTACTS FOR 2 WEEKS





All discharge instructions reviewed with patient and/or family. Voiced 

understanding.


Scripts


Nitrofurantoin Monohyd/M-Cryst (Macrobid 100 mg Capsule) 100 Mg Capsule


1 TAB PO BID, #20 CAP


   Prov: MARIVEL STEWART DO         21 


Ondansetron (Ondansetron Odt) 8 Mg Tab.rapdis


8 MG PO Q4H PRN for NAUSEA/VOMITING, #10 TAB


   Prov: MARIVEL STEWART DO         21











MARIVEL STEWART DO                  Aug 2, 2021 01:56

## 2021-08-03 ENCOUNTER — HOSPITAL ENCOUNTER (EMERGENCY)
Dept: HOSPITAL 75 - ER | Age: 41
Discharge: HOME | End: 2021-08-03
Payer: MEDICAID

## 2021-08-03 VITALS — BODY MASS INDEX: 34.55 KG/M2 | HEIGHT: 66.14 IN | WEIGHT: 214.95 LBS

## 2021-08-03 VITALS — SYSTOLIC BLOOD PRESSURE: 105 MMHG | DIASTOLIC BLOOD PRESSURE: 77 MMHG

## 2021-08-03 DIAGNOSIS — Z79.84: ICD-10-CM

## 2021-08-03 DIAGNOSIS — Z79.899: ICD-10-CM

## 2021-08-03 DIAGNOSIS — E11.9: ICD-10-CM

## 2021-08-03 DIAGNOSIS — U07.1: Primary | ICD-10-CM

## 2021-08-03 DIAGNOSIS — R42: ICD-10-CM

## 2021-08-03 DIAGNOSIS — R20.2: ICD-10-CM

## 2021-08-03 LAB
BASOPHILS # BLD AUTO: 0 10^3/UL (ref 0–0.1)
BASOPHILS NFR BLD AUTO: 0 % (ref 0–10)
BUN/CREAT SERPL: 13
CALCIUM SERPL-MCNC: 8.9 MG/DL (ref 8.5–10.1)
CHLORIDE SERPL-SCNC: 102 MMOL/L (ref 98–107)
CO2 SERPL-SCNC: 18 MMOL/L (ref 21–32)
CREAT SERPL-MCNC: 0.85 MG/DL (ref 0.6–1.3)
EOSINOPHIL # BLD AUTO: 0 10^3/UL (ref 0–0.3)
EOSINOPHIL NFR BLD AUTO: 0 % (ref 0–10)
GFR SERPLBLD BASED ON 1.73 SQ M-ARVRAT: 74 ML/MIN
GLUCOSE SERPL-MCNC: 136 MG/DL (ref 70–105)
HCT VFR BLD CALC: 43 % (ref 35–52)
HGB BLD-MCNC: 14.6 G/DL (ref 11.5–16)
LYMPHOCYTES # BLD AUTO: 3.4 10^3/UL (ref 1–4)
LYMPHOCYTES NFR BLD AUTO: 42 % (ref 12–44)
MAGNESIUM SERPL-MCNC: 1.7 MG/DL (ref 1.6–2.4)
MANUAL DIFFERENTIAL PERFORMED BLD QL: NO
MCH RBC QN AUTO: 29 PG (ref 25–34)
MCHC RBC AUTO-ENTMCNC: 34 G/DL (ref 32–36)
MCV RBC AUTO: 84 FL (ref 80–99)
MONOCYTES # BLD AUTO: 0.9 10^3/UL (ref 0–1)
MONOCYTES NFR BLD AUTO: 11 % (ref 0–12)
NEUTROPHILS # BLD AUTO: 3.8 10^3/UL (ref 1.8–7.8)
NEUTROPHILS NFR BLD AUTO: 46 % (ref 42–75)
PLATELET # BLD: 323 10^3/UL (ref 130–400)
PMV BLD AUTO: 9.9 FL (ref 9–12.2)
POTASSIUM SERPL-SCNC: 3.4 MMOL/L (ref 3.6–5)
SODIUM SERPL-SCNC: 134 MMOL/L (ref 135–145)
WBC # BLD AUTO: 8.1 10^3/UL (ref 4.3–11)

## 2021-08-03 PROCEDURE — 83735 ASSAY OF MAGNESIUM: CPT

## 2021-08-03 PROCEDURE — 80048 BASIC METABOLIC PNL TOTAL CA: CPT

## 2021-08-03 PROCEDURE — 36415 COLL VENOUS BLD VENIPUNCTURE: CPT

## 2021-08-03 PROCEDURE — 85025 COMPLETE CBC W/AUTO DIFF WBC: CPT

## 2021-08-03 NOTE — ED GENERAL
General


Chief Complaint:  Allergic Reaction


Stated Complaint:  COVID+,SOB


Nursing Triage Note:  


reports feeling like throat swollen, c/o arm tingling possible tongue swelling 


x1 hr.


Source of Information:  Patient


Exam Limitations:  No Limitations





History of Present Illness


Date Seen by Provider:  Aug 3, 2021


Time Seen by Provider:  03:25


Initial Comments


This 40-year-old woman presents to the emergency room with concerning symptoms 

associated with Covid infection.  She complains of a swelling sensation in the 

throat and at the back of the tongue.





Allergies and Home Medications


Allergies


Coded Allergies:  


     No Known Drug Allergies (Unverified , 18)





Home Medications


Docusate Sodium 100 Mg Capsule, 100 MG PO BID PRN for CONSTIPATION-1ST LINE


   Prescribed by: MIRTHA ROMERO on 18 174


Hydrocodone Bit/Acetaminophen 1 Tab Tab, 1-2 TAB PO Q4H PRN for PAIN-MODERATE


   Prescribed by: MIRTHA ROMERO on 18 174


Ibuprofen 600 Mg Tablet, 600 MG PO Q6H


   Prescribed by: MIRTHA ROMERO on 18 174


Metformin HCl 500 Mg Tablet, 1,000 PO BID, (Reported)


   take 2 in am and 1 at night 


Nitrofurantoin Monohyd/M-Cryst 100 Mg Capsule, 1 TAB PO BID


   Prescribed by: MARIVEL STEWART on 21 0210


Ondansetron 8 Mg Tab.rapdis, 8 MG PO Q4H PRN for NAUSEA/VOMITING


   Prescribed by: MARIVEL STEWART on 21 0156


Irh472/Iron Fumarate/FA/Dss 1 Each Tablet, 1 EACH PO DAILY, (Reported)





Past Medical-Social-Family Hx


Patient Social History


Tobacco Use?:  No


Substance use?:  Yes


Substance type:  Marijuana


Alcohol Use?:  No


Pt feels they are or have been:  No





Immunizations Up To Date


Tetanus Booster (TDap):  Unknown





Seasonal Allergies


Seasonal Allergies:  No





Past Medical History


Surgeries:  Yes (LIPOMA FROM ABDX2,  2 )


Appendectomy,  Section


Respiratory:  No


Cardiac:  No


Neurological:  No


Reproductive Disorders:  Yes


Female Reproductive Disorders:  Menstrual Problems


Sexually Transmitted Disease:  No


HIV/AIDS:  No


Genitourinary:  No


Gastrointestinal:  Yes


Gastroesophageal Reflux


Musculoskeletal:  No


Endocrine:  Yes (GESTATIONAL DIABETES)


Diabetes, Non-Insulin dep


HEENT:  No


Loss of Vision:  Denies


Hearing Impairment:  Denies


Cancer:  No


Psychosocial:  Yes (HX OF ANXIETY)


Anxiety, Depression


Integumentary:  No


Blood Disorders:  No


Adverse Reaction/Blood Tranf:  No (N/A)





Physical Exam


Vital Signs





Vital Signs - First Documented








 8/3/21





 03:15


 


Temp 37.0


 


Pulse 96


 


Resp 18


 


B/P (MAP) 105/89 (94)


 


Pulse Ox 96


 


O2 Delivery Room Air





Capillary Refill : Less Than 3 Seconds


Height, Weight, BMI


Height: 5'6.00"


Weight: 228lbs. 0.0oz. 103.578183ih; 34.00 BMI


Method:Stated





Progress/Results/Core Measures


Suspected Sepsis


SIRS


Temperature: 


Pulse: 96 


Respiratory Rate: 18


 


Laboratory Tests


8/3/21 03:35: White Blood Count 8.1


Blood Pressure 105 /89 


Mean: 94


 


Laboratory Tests


8/3/21 03:35: 


Creatinine 0.85, Platelet Count 323








Results/Orders


Lab Results





Laboratory Tests








Test


 8/3/21


03:35 Range/Units


 


 


White Blood Count


 8.1 


 4.3-11.0


10^3/uL


 


Red Blood Count


 5.13 H


 3.80-5.11


10^6/uL


 


Hemoglobin 14.6  11.5-16.0  g/dL


 


Hematocrit 43  35-52  %


 


Mean Corpuscular Volume 84  80-99  fL


 


Mean Corpuscular Hemoglobin 29  25-34  pg


 


Mean Corpuscular Hemoglobin


Concent 34 


 32-36  g/dL





 


Red Cell Distribution Width 12.5  10.0-14.5  %


 


Platelet Count


 323 


 130-400


10^3/uL


 


Mean Platelet Volume 9.9  9.0-12.2  fL


 


Immature Granulocyte % (Auto) 0   %


 


Neutrophils (%) (Auto) 46  42-75  %


 


Lymphocytes (%) (Auto) 42  12-44  %


 


Monocytes (%) (Auto) 11  0-12  %


 


Eosinophils (%) (Auto) 0  0-10  %


 


Basophils (%) (Auto) 0  0-10  %


 


Neutrophils # (Auto)


 3.8 


 1.8-7.8


10^3/uL


 


Lymphocytes # (Auto)


 3.4 


 1.0-4.0


10^3/uL


 


Monocytes # (Auto)


 0.9 


 0.0-1.0


10^3/uL


 


Eosinophils # (Auto)


 0.0 


 0.0-0.3


10^3/uL


 


Basophils # (Auto)


 0.0 


 0.0-0.1


10^3/uL


 


Immature Granulocyte # (Auto)


 0.0 


 0.0-0.1


10^3/uL


 


Sodium Level 134 L 135-145  MMOL/L


 


Potassium Level 3.4 L 3.6-5.0  MMOL/L


 


Chloride Level 102    MMOL/L


 


Carbon Dioxide Level 18 L 21-32  MMOL/L


 


Anion Gap 14  5-14  MMOL/L


 


Blood Urea Nitrogen 11  7-18  MG/DL


 


Creatinine


 0.85 


 0.60-1.30


MG/DL


 


Estimat Glomerular Filtration


Rate 74 


  





 


BUN/Creatinine Ratio 13   


 


Glucose Level 136 H   MG/DL


 


Calcium Level 8.9  8.5-10.1  MG/DL


 


Magnesium Level 1.7  1.6-2.4  MG/DL








My Orders





Orders - MIGUELITO AVALOS MD


Basic Metabolic Panel (8/3/21 03:35)


Cbc With Automated Diff (8/3/21 03:35)


Magnesium (8/3/21 03:35)


Ed Iv/Invasive Line Start (8/3/21 03:35)


Lactated Ringers (Lr 1000 Ml Iv Solution (8/3/21 03:45)





Medications Given in ED





Current Medications








 Medications  Dose


 Ordered  Sig/Ashley


 Route  Start Time


 Stop Time Status Last Admin


Dose Admin


 


 Lactated Ringer's  1,000 ml @ 


 0 mls/hr  Q0M ONCE


 IV  8/3/21 03:45


 8/3/21 03:47 DC 8/3/21 03:39


0 MLS/HR








Vital Signs/I&O











 8/3/21





 03:15


 


Temp 37.0


 


Pulse 96


 


Resp 18


 


B/P (MAP) 105/89 (94)


 


Pulse Ox 96


 


O2 Delivery Room Air





Capillary Refill : Less Than 3 Seconds








Blood Pressure Mean:                    94











Departure


Impression





   Primary Impression:  


   COVID-19 virus infection


   Additional Impressions:  


   Paresthesias


   Lightheadedness


Disposition:  01 HOME, SELF-CARE


Condition:  Improved





Departure-Patient Inst.


Decision time for Depature:  04:48


Referrals:  


[facility] (PCP/Family)


Primary Care Physician


Patient Instructions:  COVID-19 Overview, Paresthesia (DC), REGEN-COV 

(casirivimab and imdevimab) FDA Fact Sheet





Add. Discharge Instructions:  


Continue to drink plenty of clear liquids.


Follow through with your monoclonal antibody infusion tomorrow.


You may take Tylenol and/or ibuprofen for pain or fever.


Paresthesias, headaches, anxiety, changes in taste and smell, and other 

neurologic symptoms are common during COVID-19.  If the symptoms are not 

tolerable or escalating, return to the ER for further evaluation.


Call with questions or concerns.








All discharge instructions reviewed with patient and/or family. Voiced 

understanding.











MIGUELITO AVALOS MD         Aug 3, 2021 04:50

## 2021-08-05 ENCOUNTER — HOSPITAL ENCOUNTER (OUTPATIENT)
Dept: HOSPITAL 75 - INFUSION | Age: 41
End: 2021-08-05
Attending: EMERGENCY MEDICINE
Payer: MEDICAID

## 2021-08-05 VITALS — HEIGHT: 66.02 IN | WEIGHT: 215.17 LBS | BODY MASS INDEX: 34.58 KG/M2

## 2021-08-05 VITALS — SYSTOLIC BLOOD PRESSURE: 119 MMHG | DIASTOLIC BLOOD PRESSURE: 85 MMHG

## 2021-08-05 VITALS — DIASTOLIC BLOOD PRESSURE: 78 MMHG | SYSTOLIC BLOOD PRESSURE: 122 MMHG

## 2021-08-05 DIAGNOSIS — Z23: Primary | ICD-10-CM

## 2021-08-05 DIAGNOSIS — U07.1: ICD-10-CM
